# Patient Record
Sex: FEMALE | Race: WHITE | NOT HISPANIC OR LATINO | URBAN - METROPOLITAN AREA
[De-identification: names, ages, dates, MRNs, and addresses within clinical notes are randomized per-mention and may not be internally consistent; named-entity substitution may affect disease eponyms.]

---

## 2017-02-11 ENCOUNTER — EMERGENCY (EMERGENCY)
Facility: HOSPITAL | Age: 32
LOS: 1 days | Discharge: HOME | End: 2017-02-11
Attending: EMERGENCY MEDICINE | Admitting: EMERGENCY MEDICINE
Payer: COMMERCIAL

## 2017-02-11 VITALS
OXYGEN SATURATION: 98 % | RESPIRATION RATE: 18 BRPM | HEART RATE: 77 BPM | SYSTOLIC BLOOD PRESSURE: 114 MMHG | DIASTOLIC BLOOD PRESSURE: 73 MMHG | TEMPERATURE: 98 F

## 2017-02-11 DIAGNOSIS — N60.09 SOLITARY CYST OF UNSPECIFIED BREAST: Chronic | ICD-10-CM

## 2017-02-11 DIAGNOSIS — H92.01 OTALGIA, RIGHT EAR: ICD-10-CM

## 2017-02-11 PROCEDURE — 99283 EMERGENCY DEPT VISIT LOW MDM: CPT | Mod: 25

## 2017-02-11 PROCEDURE — 99283 EMERGENCY DEPT VISIT LOW MDM: CPT

## 2017-02-11 NOTE — ED PROVIDER NOTE - OBJECTIVE STATEMENT
32 yo female with no pMH, PSH, NKDA not on any meds presents with right ear pain, atraumatic, no hearing loss and feeling dizzy everytime she bends over. No n/v/visual changes, focal sensory deficits or gait imbalance. No f/s/c/nasal drainage.She has been having nasal congestion and would "pop her ears". On PE: AVSS, no mastoid ttp, op normal, no pain with pulling of pinna, no otitis externa, + evidence of a perforated TM with no purulence.  No otitis media.

## 2017-02-11 NOTE — ED PROVIDER NOTE - PLAN OF CARE
1) Do note get water in ear  2) Avoid flying  3) Do not put things in ear  4) Please use ofloxacin ear drops 5 drops in right ear every 12 hours for 5 days  5) Please follow with an ENT and your PMD

## 2017-02-11 NOTE — ED PROVIDER NOTE - CARE PLAN
Principal Discharge DX:	Perforated ear drum, right  Instructions for follow-up, activity and diet:	1) Do note get water in ear  2) Avoid flying  3) Do not put things in ear  4) Please use ofloxacin ear drops 5 drops in right ear every 12 hours for 5 days  5) Please follow with an ENT and your PMD

## 2017-02-11 NOTE — ED ADULT NURSE NOTE - OBJECTIVE STATEMENT
Presents with rt ear pain. Denies injury. Pain increases with bending forward. No fever/chills. No gait abnormalities. Denies numbness/tingling/cp/sob. Pt A&Ox3. FARRELL. Ambulates. Respirations even/unlabored. Abd soft. No n/v/d. Skin WDI. Exam by MD reveals perforated tympanic.

## 2018-01-22 ENCOUNTER — TRANSCRIPTION ENCOUNTER (OUTPATIENT)
Age: 33
End: 2018-01-22

## 2018-01-28 ENCOUNTER — TRANSCRIPTION ENCOUNTER (OUTPATIENT)
Age: 33
End: 2018-01-28

## 2019-02-02 ENCOUNTER — TRANSCRIPTION ENCOUNTER (OUTPATIENT)
Age: 34
End: 2019-02-02

## 2019-02-03 ENCOUNTER — INPATIENT (INPATIENT)
Facility: HOSPITAL | Age: 34
LOS: 2 days | Discharge: HOME | End: 2019-02-06
Attending: OBSTETRICS & GYNECOLOGY | Admitting: OBSTETRICS & GYNECOLOGY
Payer: COMMERCIAL

## 2019-02-03 VITALS
OXYGEN SATURATION: 100 % | TEMPERATURE: 97 F | SYSTOLIC BLOOD PRESSURE: 120 MMHG | DIASTOLIC BLOOD PRESSURE: 66 MMHG | HEART RATE: 86 BPM

## 2019-02-03 DIAGNOSIS — N60.09 SOLITARY CYST OF UNSPECIFIED BREAST: Chronic | ICD-10-CM

## 2019-02-03 DIAGNOSIS — O48.0 POST-TERM PREGNANCY: ICD-10-CM

## 2019-02-03 LAB
BASOPHILS # BLD AUTO: 0.1 K/UL — SIGNIFICANT CHANGE UP (ref 0–0.2)
BASOPHILS NFR BLD AUTO: 0.6 % — SIGNIFICANT CHANGE UP (ref 0–2)
BLD GP AB SCN SERPL QL: NEGATIVE — SIGNIFICANT CHANGE UP
EOSINOPHIL # BLD AUTO: 0.1 K/UL — SIGNIFICANT CHANGE UP (ref 0–0.5)
EOSINOPHIL NFR BLD AUTO: 0.9 % — SIGNIFICANT CHANGE UP (ref 0–6)
HCT VFR BLD CALC: 26.9 % — LOW (ref 34.5–45)
HGB BLD-MCNC: 9.5 G/DL — LOW (ref 11.5–15.5)
LYMPHOCYTES # BLD AUTO: 2.3 K/UL — SIGNIFICANT CHANGE UP (ref 1–3.3)
LYMPHOCYTES # BLD AUTO: 22.4 % — SIGNIFICANT CHANGE UP (ref 13–44)
MCHC RBC-ENTMCNC: 21.9 PG — LOW (ref 27–34)
MCHC RBC-ENTMCNC: 35.1 GM/DL — SIGNIFICANT CHANGE UP (ref 32–36)
MCV RBC AUTO: 62.4 FL — LOW (ref 80–100)
MONOCYTES # BLD AUTO: 0.6 K/UL — SIGNIFICANT CHANGE UP (ref 0–0.9)
MONOCYTES NFR BLD AUTO: 5.5 % — SIGNIFICANT CHANGE UP (ref 2–14)
NEUTROPHILS # BLD AUTO: 7.4 K/UL — SIGNIFICANT CHANGE UP (ref 1.8–7.4)
NEUTROPHILS NFR BLD AUTO: 70.6 % — SIGNIFICANT CHANGE UP (ref 43–77)
PLATELET # BLD AUTO: 257 K/UL — SIGNIFICANT CHANGE UP (ref 150–400)
RBC # BLD: 4.31 M/UL — SIGNIFICANT CHANGE UP (ref 3.8–5.2)
RBC # FLD: 15.8 % — HIGH (ref 10.3–14.5)
RH IG SCN BLD-IMP: POSITIVE — SIGNIFICANT CHANGE UP
RH IG SCN BLD-IMP: POSITIVE — SIGNIFICANT CHANGE UP
T PALLIDUM AB TITR SER: NEGATIVE — SIGNIFICANT CHANGE UP
WBC # BLD: 10.5 K/UL — SIGNIFICANT CHANGE UP (ref 3.8–10.5)
WBC # FLD AUTO: 10.5 K/UL — SIGNIFICANT CHANGE UP (ref 3.8–10.5)

## 2019-02-03 RX ORDER — SODIUM CHLORIDE 9 MG/ML
500 INJECTION, SOLUTION INTRAVENOUS
Qty: 0 | Refills: 0 | Status: DISCONTINUED | OUTPATIENT
Start: 2019-02-03 | End: 2019-02-06

## 2019-02-03 RX ORDER — DIPHENHYDRAMINE HCL 50 MG
25 CAPSULE ORAL EVERY 6 HOURS
Qty: 0 | Refills: 0 | Status: DISCONTINUED | OUTPATIENT
Start: 2019-02-03 | End: 2019-02-06

## 2019-02-03 RX ORDER — TETANUS TOXOID, REDUCED DIPHTHERIA TOXOID AND ACELLULAR PERTUSSIS VACCINE, ADSORBED 5; 2.5; 8; 8; 2.5 [IU]/.5ML; [IU]/.5ML; UG/.5ML; UG/.5ML; UG/.5ML
0.5 SUSPENSION INTRAMUSCULAR ONCE
Qty: 0 | Refills: 0 | Status: DISCONTINUED | OUTPATIENT
Start: 2019-02-03 | End: 2019-02-06

## 2019-02-03 RX ORDER — DOCUSATE SODIUM 100 MG
100 CAPSULE ORAL
Qty: 0 | Refills: 0 | Status: DISCONTINUED | OUTPATIENT
Start: 2019-02-03 | End: 2019-02-06

## 2019-02-03 RX ORDER — OXYCODONE HYDROCHLORIDE 5 MG/1
5 TABLET ORAL EVERY 4 HOURS
Qty: 0 | Refills: 0 | Status: COMPLETED | OUTPATIENT
Start: 2019-02-05 | End: 2019-02-12

## 2019-02-03 RX ORDER — SODIUM CHLORIDE 9 MG/ML
1000 INJECTION, SOLUTION INTRAVENOUS ONCE
Qty: 0 | Refills: 0 | Status: COMPLETED | OUTPATIENT
Start: 2019-02-03 | End: 2019-02-03

## 2019-02-03 RX ORDER — DEXAMETHASONE 0.5 MG/5ML
4 ELIXIR ORAL EVERY 6 HOURS
Qty: 0 | Refills: 0 | Status: DISCONTINUED | OUTPATIENT
Start: 2019-02-03 | End: 2019-02-05

## 2019-02-03 RX ORDER — IBUPROFEN 200 MG
600 TABLET ORAL EVERY 6 HOURS
Qty: 0 | Refills: 0 | Status: COMPLETED | OUTPATIENT
Start: 2019-02-05 | End: 2020-01-04

## 2019-02-03 RX ORDER — NALOXONE HYDROCHLORIDE 4 MG/.1ML
0.1 SPRAY NASAL
Qty: 0 | Refills: 0 | Status: DISCONTINUED | OUTPATIENT
Start: 2019-02-03 | End: 2019-02-05

## 2019-02-03 RX ORDER — ONDANSETRON 8 MG/1
4 TABLET, FILM COATED ORAL EVERY 6 HOURS
Qty: 0 | Refills: 0 | Status: DISCONTINUED | OUTPATIENT
Start: 2019-02-03 | End: 2019-02-05

## 2019-02-03 RX ORDER — OXYTOCIN 10 UNIT/ML
41.67 VIAL (ML) INJECTION
Qty: 20 | Refills: 0 | Status: DISCONTINUED | OUTPATIENT
Start: 2019-02-03 | End: 2019-02-06

## 2019-02-03 RX ORDER — OXYTOCIN 10 UNIT/ML
2 VIAL (ML) INJECTION
Qty: 30 | Refills: 0 | Status: DISCONTINUED | OUTPATIENT
Start: 2019-02-03 | End: 2019-02-03

## 2019-02-03 RX ORDER — SODIUM CHLORIDE 9 MG/ML
1000 INJECTION, SOLUTION INTRAVENOUS
Qty: 0 | Refills: 0 | Status: DISCONTINUED | OUTPATIENT
Start: 2019-02-03 | End: 2019-02-03

## 2019-02-03 RX ORDER — LANOLIN
1 OINTMENT (GRAM) TOPICAL
Qty: 0 | Refills: 0 | Status: DISCONTINUED | OUTPATIENT
Start: 2019-02-03 | End: 2019-02-06

## 2019-02-03 RX ORDER — OXYCODONE HYDROCHLORIDE 5 MG/1
5 TABLET ORAL
Qty: 0 | Refills: 0 | Status: COMPLETED | OUTPATIENT
Start: 2019-02-05 | End: 2019-02-12

## 2019-02-03 RX ORDER — OXYTOCIN 10 UNIT/ML
333.33 VIAL (ML) INJECTION
Qty: 20 | Refills: 0 | Status: DISCONTINUED | OUTPATIENT
Start: 2019-02-03 | End: 2019-02-06

## 2019-02-03 RX ORDER — ACETAMINOPHEN 500 MG
975 TABLET ORAL EVERY 6 HOURS
Qty: 0 | Refills: 0 | Status: DISCONTINUED | OUTPATIENT
Start: 2019-02-03 | End: 2019-02-06

## 2019-02-03 RX ORDER — OXYTOCIN 10 UNIT/ML
333.33 VIAL (ML) INJECTION
Qty: 20 | Refills: 0 | Status: DISCONTINUED | OUTPATIENT
Start: 2019-02-03 | End: 2019-02-03

## 2019-02-03 RX ORDER — SODIUM CHLORIDE 9 MG/ML
1000 INJECTION, SOLUTION INTRAVENOUS
Qty: 0 | Refills: 0 | Status: DISCONTINUED | OUTPATIENT
Start: 2019-02-03 | End: 2019-02-06

## 2019-02-03 RX ORDER — OXYTOCIN 10 UNIT/ML
41.67 VIAL (ML) INJECTION
Qty: 20 | Refills: 0 | Status: DISCONTINUED | OUTPATIENT
Start: 2019-02-03 | End: 2019-02-03

## 2019-02-03 RX ORDER — KETOROLAC TROMETHAMINE 30 MG/ML
30 SYRINGE (ML) INJECTION EVERY 6 HOURS
Qty: 0 | Refills: 0 | Status: DISCONTINUED | OUTPATIENT
Start: 2019-02-03 | End: 2019-02-05

## 2019-02-03 RX ORDER — CITRIC ACID/SODIUM CITRATE 300-500 MG
15 SOLUTION, ORAL ORAL EVERY 4 HOURS
Qty: 0 | Refills: 0 | Status: DISCONTINUED | OUTPATIENT
Start: 2019-02-03 | End: 2019-02-03

## 2019-02-03 RX ORDER — HEPARIN SODIUM 5000 [USP'U]/ML
5000 INJECTION INTRAVENOUS; SUBCUTANEOUS EVERY 12 HOURS
Qty: 0 | Refills: 0 | Status: DISCONTINUED | OUTPATIENT
Start: 2019-02-03 | End: 2019-02-06

## 2019-02-03 RX ORDER — GLYCERIN ADULT
1 SUPPOSITORY, RECTAL RECTAL AT BEDTIME
Qty: 0 | Refills: 0 | Status: DISCONTINUED | OUTPATIENT
Start: 2019-02-03 | End: 2019-02-06

## 2019-02-03 RX ORDER — FERROUS SULFATE 325(65) MG
325 TABLET ORAL DAILY
Qty: 0 | Refills: 0 | Status: DISCONTINUED | OUTPATIENT
Start: 2019-02-03 | End: 2019-02-04

## 2019-02-03 RX ORDER — SIMETHICONE 80 MG/1
80 TABLET, CHEWABLE ORAL EVERY 4 HOURS
Qty: 0 | Refills: 0 | Status: DISCONTINUED | OUTPATIENT
Start: 2019-02-03 | End: 2019-02-06

## 2019-02-03 RX ADMIN — Medication 30 MILLIGRAM(S): at 18:22

## 2019-02-03 RX ADMIN — Medication 15 MILLILITER(S): at 08:07

## 2019-02-03 RX ADMIN — SODIUM CHLORIDE 2000 MILLILITER(S): 9 INJECTION, SOLUTION INTRAVENOUS at 06:40

## 2019-02-03 RX ADMIN — SIMETHICONE 80 MILLIGRAM(S): 80 TABLET, CHEWABLE ORAL at 18:28

## 2019-02-03 RX ADMIN — Medication 30 MILLIGRAM(S): at 19:00

## 2019-02-03 RX ADMIN — Medication 125 MILLIUNIT(S)/MIN: at 11:17

## 2019-02-03 RX ADMIN — Medication 30 MILLIGRAM(S): at 11:20

## 2019-02-03 RX ADMIN — Medication 975 MILLIGRAM(S): at 16:00

## 2019-02-03 RX ADMIN — Medication 2 MILLIUNIT(S)/MIN: at 06:44

## 2019-02-03 RX ADMIN — HEPARIN SODIUM 5000 UNIT(S): 5000 INJECTION INTRAVENOUS; SUBCUTANEOUS at 18:22

## 2019-02-03 RX ADMIN — Medication 25 MILLIGRAM(S): at 19:22

## 2019-02-04 ENCOUNTER — TRANSCRIPTION ENCOUNTER (OUTPATIENT)
Age: 34
End: 2019-02-04

## 2019-02-04 LAB
BASOPHILS # BLD AUTO: 0.1 K/UL — SIGNIFICANT CHANGE UP (ref 0–0.2)
BASOPHILS NFR BLD AUTO: 0.7 % — SIGNIFICANT CHANGE UP (ref 0–2)
EOSINOPHIL # BLD AUTO: 0.1 K/UL — SIGNIFICANT CHANGE UP (ref 0–0.5)
EOSINOPHIL NFR BLD AUTO: 1.1 % — SIGNIFICANT CHANGE UP (ref 0–6)
HCT VFR BLD CALC: 21.7 % — LOW (ref 34.5–45)
HCT VFR BLD CALC: 25.4 % — LOW (ref 34.5–45)
HGB BLD-MCNC: 7.3 G/DL — LOW (ref 11.5–15.5)
HGB BLD-MCNC: 8.4 G/DL — LOW (ref 11.5–15.5)
LYMPHOCYTES # BLD AUTO: 2.1 K/UL — SIGNIFICANT CHANGE UP (ref 1–3.3)
LYMPHOCYTES # BLD AUTO: 26 % — SIGNIFICANT CHANGE UP (ref 13–44)
MCHC RBC-ENTMCNC: 21 PG — LOW (ref 27–34)
MCHC RBC-ENTMCNC: 21.4 PG — LOW (ref 27–34)
MCHC RBC-ENTMCNC: 33.1 GM/DL — SIGNIFICANT CHANGE UP (ref 32–36)
MCHC RBC-ENTMCNC: 33.7 GM/DL — SIGNIFICANT CHANGE UP (ref 32–36)
MCV RBC AUTO: 63.3 FL — LOW (ref 80–100)
MCV RBC AUTO: 63.5 FL — LOW (ref 80–100)
MONOCYTES # BLD AUTO: 0.4 K/UL — SIGNIFICANT CHANGE UP (ref 0–0.9)
MONOCYTES NFR BLD AUTO: 4.8 % — SIGNIFICANT CHANGE UP (ref 2–14)
NEUTROPHILS # BLD AUTO: 5.6 K/UL — SIGNIFICANT CHANGE UP (ref 1.8–7.4)
NEUTROPHILS NFR BLD AUTO: 67.4 % — SIGNIFICANT CHANGE UP (ref 43–77)
PLATELET # BLD AUTO: 190 K/UL — SIGNIFICANT CHANGE UP (ref 150–400)
PLATELET # BLD AUTO: 221 K/UL — SIGNIFICANT CHANGE UP (ref 150–400)
RBC # BLD: 3.42 M/UL — LOW (ref 3.8–5.2)
RBC # BLD: 4 M/UL — SIGNIFICANT CHANGE UP (ref 3.8–5.2)
RBC # FLD: 15.5 % — HIGH (ref 10.3–14.5)
RBC # FLD: 16 % — HIGH (ref 10.3–14.5)
WBC # BLD: 8.2 K/UL — SIGNIFICANT CHANGE UP (ref 3.8–10.5)
WBC # BLD: 9.6 K/UL — SIGNIFICANT CHANGE UP (ref 3.8–10.5)
WBC # FLD AUTO: 8.2 K/UL — SIGNIFICANT CHANGE UP (ref 3.8–10.5)
WBC # FLD AUTO: 9.6 K/UL — SIGNIFICANT CHANGE UP (ref 3.8–10.5)

## 2019-02-04 RX ORDER — ACETAMINOPHEN 500 MG
3 TABLET ORAL
Qty: 0 | Refills: 0 | DISCHARGE
Start: 2019-02-04

## 2019-02-04 RX ORDER — ASCORBIC ACID 60 MG
500 TABLET,CHEWABLE ORAL
Qty: 0 | Refills: 0 | Status: DISCONTINUED | OUTPATIENT
Start: 2019-02-04 | End: 2019-02-06

## 2019-02-04 RX ORDER — ASCORBIC ACID 60 MG
1 TABLET,CHEWABLE ORAL
Qty: 0 | Refills: 0 | DISCHARGE
Start: 2019-02-04

## 2019-02-04 RX ORDER — FERROUS SULFATE 325(65) MG
325 TABLET ORAL
Qty: 0 | Refills: 0 | Status: DISCONTINUED | OUTPATIENT
Start: 2019-02-04 | End: 2019-02-06

## 2019-02-04 RX ADMIN — Medication 975 MILLIGRAM(S): at 20:12

## 2019-02-04 RX ADMIN — Medication 30 MILLIGRAM(S): at 06:35

## 2019-02-04 RX ADMIN — Medication 30 MILLIGRAM(S): at 22:00

## 2019-02-04 RX ADMIN — Medication 30 MILLIGRAM(S): at 01:04

## 2019-02-04 RX ADMIN — HEPARIN SODIUM 5000 UNIT(S): 5000 INJECTION INTRAVENOUS; SUBCUTANEOUS at 17:55

## 2019-02-04 RX ADMIN — Medication 30 MILLIGRAM(S): at 22:16

## 2019-02-04 RX ADMIN — HEPARIN SODIUM 5000 UNIT(S): 5000 INJECTION INTRAVENOUS; SUBCUTANEOUS at 06:09

## 2019-02-04 RX ADMIN — Medication 30 MILLIGRAM(S): at 16:37

## 2019-02-04 RX ADMIN — Medication 975 MILLIGRAM(S): at 20:40

## 2019-02-04 RX ADMIN — Medication 100 MILLIGRAM(S): at 17:57

## 2019-02-04 RX ADMIN — Medication 975 MILLIGRAM(S): at 16:30

## 2019-02-04 RX ADMIN — Medication 325 MILLIGRAM(S): at 17:55

## 2019-02-04 RX ADMIN — SIMETHICONE 80 MILLIGRAM(S): 80 TABLET, CHEWABLE ORAL at 17:57

## 2019-02-04 RX ADMIN — Medication 30 MILLIGRAM(S): at 17:10

## 2019-02-04 RX ADMIN — Medication 30 MILLIGRAM(S): at 01:32

## 2019-02-04 RX ADMIN — Medication 30 MILLIGRAM(S): at 06:09

## 2019-02-04 RX ADMIN — Medication 975 MILLIGRAM(S): at 14:01

## 2019-02-04 RX ADMIN — Medication 500 MILLIGRAM(S): at 17:55

## 2019-02-04 NOTE — CHART NOTE - NSCHARTNOTEFT_GEN_A_CORE
R1 OBGYN EVENT NOTE    Patient seen and evaluated at bedside for anemia. Patient with low H/H on admission, dropped appropriately. Positive orthostatics postpartum. Patient denies lightheadedness, dizziness, chest pain, shortness of breath. Is ambulating, tolerating PO. Bleeding light.     O:   T(C): 36.7 (02-04-19 @ 17:06), Max: 36.7 (02-04-19 @ 17:06)  HR: 78 (02-04-19 @ 17:06) (78 - 78)  BP: 127/85 (02-04-19 @ 17:06) (127/85 - 127/85)  RR: 18 (02-04-19 @ 17:06) (18 - 18)  SpO2: 99% (02-04-19 @ 17:06) (99% - 99%)  Wt(kg): --  I&O's Summary    03 Feb 2019 07:01  -  04 Feb 2019 07:00  --------------------------------------------------------  IN: 3150 mL / OUT: 3050 mL / NET: 100 mL        Gen: Resting comfortably in bed, NAD  CV: RRR  Ext: SCD's in place and functional, no edema    acetaminophen   Tablet .. 975 milliGRAM(s) Oral every 6 hours  ascorbic acid 500 milliGRAM(s) Oral two times a day  dexamethasone  Injectable 4 milliGRAM(s) IV Push every 6 hours PRN  dextrose 5% + lactated ringers. 500 milliLiter(s) IV Continuous <Continuous>  diphenhydrAMINE 25 milliGRAM(s) Oral every 6 hours PRN  diphtheria/tetanus/pertussis (acellular) Vaccine (ADAcel) 0.5 milliLiter(s) IntraMuscular once  docusate sodium 100 milliGRAM(s) Oral two times a day PRN  fentaNYL (3 MICROgram(s)/mL) + BUpivacaine 0.01% in 0.9% Sodium Chloride PCEA 250 milliLiter(s) Epidural PCA Continuous  fentaNYL (3 MICROgram(s)/mL) + BUpivacaine 0.01% in 0.9% Sodium Chloride PCEA Rescue Clinician Bolus 5 milliLiter(s) Epidural every 15 minutes PRN  ferrous    sulfate 325 milliGRAM(s) Oral two times a day  glycerin Suppository - Adult 1 Suppository(s) Rectal at bedtime PRN  heparin  Injectable 5000 Unit(s) SubCutaneous every 12 hours  ketorolac   Injectable 30 milliGRAM(s) IV Push every 6 hours  lactated ringers. 1000 milliLiter(s) IV Continuous <Continuous>  lanolin Ointment 1 Application(s) Topical every 3 hours PRN  naloxone Injectable 0.1 milliGRAM(s) IV Push every 3 minutes PRN  ondansetron Injectable 4 milliGRAM(s) IV Push every 6 hours PRN  oxytocin Infusion 333.333 milliUNIT(s)/Min IV Continuous <Continuous>  oxytocin Infusion 41.667 milliUNIT(s)/Min IV Continuous <Continuous>  prenatal multivitamin 1 Tablet(s) Oral daily  simethicone 80 milliGRAM(s) Chew every 4 hours PRN            8.4  9.6 >< 221     02-04-19 @ 14:33        25.4          7.3  8.2 >< 190     02-04-19 @ 06:06        21.7          A/P: 33y POD#1 s/p LTCS with anemia, repeat H/H stable. H/H drop appropriate for EBL. Patient is hemodynamically stable and clinically well.  -continue to monitor  -POD#3 CBC    d/w Dr. Christopher Hernandez MD PGY1  Pager #[phone]

## 2019-02-04 NOTE — PROGRESS NOTE ADULT - SUBJECTIVE AND OBJECTIVE BOX
Postpartum Note-  Section POD#1    Allergies    No Known Allergies    Intolerances        Blood Type B Positive     RPR Negative    Rubella: Immune    S: Patient is a  33y P 1001   POD#1 S/P  primary C/Sec  Patient w/o complaints, pain is controlled.  Pt is OOB, tolerating PO, passing flatus. Lochia WNL. Voiding without difficulty    Feeding: Breast    O:  Vital Signs Last 24 Hrs  T(C): 36.7 (2019 05:00), Max: 36.9 (2019 14:40)  T(F): 98.1 (2019 05:00), Max: 98.5 (2019 01:00)  HR: 99 (2019 05:00) (66 - 99)  BP: 114/75 (2019 05:00) (112/61 - 143/62)  BP(mean): 95 (2019 12:35) (80 - 95)  RR: 18 (2019 05:00) (16 - 22)  SpO2: 97% (2019 05:00) (97% - 100%)  I&O's Summary    2019 07:01  -  2019 07:00  --------------------------------------------------------  IN: 3150 mL / OUT: 3050 mL / NET: 100 mL        Gen: NAD  Abdomen: +BS, Soft, nontender, non-distended, fundus firm.  Incision: Clean, dry, and intact.  Negative erythema/edema/ecchymosis   Sub Q/steri  Lochia WNL  Ext: SCDs in place.  Negative Homans B/L    LABS:                          7.3    8.2   )-----------( 190      ( 2019 06:06 )             21.7

## 2019-02-04 NOTE — DISCHARGE NOTE OB - PLAN OF CARE
safe delivery of baby and post partum care repeat c/s done, has low iron anemia need to be treated with oral iron

## 2019-02-04 NOTE — PROGRESS NOTE ADULT - SUBJECTIVE AND OBJECTIVE BOX
Patient seen and examined at bedside, no acute overnight events. No acute complaints, pain well controlled. Patient is ambulating and tolerating regular diet. Has not yet passed flatus. Pt is breast  feeding her baby.  not using her epidural anesthesia.  she is not symptomatic from low h&h and walking and moving around  is by her.    Vital Signs Last 24 Hours  T(C): 36.7 (02-04-19 @ 09:17), Max: 36.9 (02-03-19 @ 14:40)  HR: 88 (02-04-19 @ 09:17) (66 - 99)  BP: 121/84 (02-04-19 @ 09:17) (112/61 - 123/78)  RR: 17 (02-04-19 @ 09:17) (17 - 22)  SpO2: 98% (02-04-19 @ 09:17) (97% - 100%)    I&O's Summary    03 Feb 2019 07:01  -  04 Feb 2019 07:00  --------------------------------------------------------  IN: 3150 mL / OUT: 3050 mL / NET: 100 mL        Physical Exam:  General: NAD  Abdomen: Soft, non-tender, non-distended, fundus firm  Incision: Pfannenstiel incision CDI, subcuticular suture closure  Pelvic: Lochia wnl    Labs:    Blood Type: B Positive  Antibody Screen: --  RPR: Negative               7.3    8.2   )-----------( 190      ( 02-04 @ 06:06 )             21.7                9.5    10.5  )-----------( 257      ( 02-03 @ 06:53 )             26.9         MEDICATIONS  (STANDING):  acetaminophen   Tablet .. 975 milliGRAM(s) Oral every 6 hours  ascorbic acid 500 milliGRAM(s) Oral two times a day  dextrose 5% + lactated ringers. 500 milliLiter(s) (125 mL/Hr) IV Continuous <Continuous>  diphtheria/tetanus/pertussis (acellular) Vaccine (ADAcel) 0.5 milliLiter(s) IntraMuscular once  fentaNYL (3 MICROgram(s)/mL) + BUpivacaine 0.01% in 0.9% Sodium Chloride PCEA 250 milliLiter(s) Epidural PCA Continuous  ferrous    sulfate 325 milliGRAM(s) Oral two times a day  heparin  Injectable 5000 Unit(s) SubCutaneous every 12 hours  ketorolac   Injectable 30 milliGRAM(s) IV Push every 6 hours  lactated ringers. 1000 milliLiter(s) (125 mL/Hr) IV Continuous <Continuous>  oxytocin Infusion 333.333 milliUNIT(s)/Min (1000 mL/Hr) IV Continuous <Continuous>  oxytocin Infusion 41.667 milliUNIT(s)/Min (125 mL/Hr) IV Continuous <Continuous>  prenatal multivitamin 1 Tablet(s) Oral daily    MEDICATIONS  (PRN):  dexamethasone  Injectable 4 milliGRAM(s) IV Push every 6 hours PRN Nausea, IF ondansetron is ineffective after 30 - 60 minutes  diphenhydrAMINE 25 milliGRAM(s) Oral every 6 hours PRN Itching  docusate sodium 100 milliGRAM(s) Oral two times a day PRN Stool Softening  fentaNYL (3 MICROgram(s)/mL) + BUpivacaine 0.01% in 0.9% Sodium Chloride PCEA Rescue Clinician Bolus 5 milliLiter(s) Epidural every 15 minutes PRN Moderate Pain (4 - 6)  glycerin Suppository - Adult 1 Suppository(s) Rectal at bedtime PRN Constipation  lanolin Ointment 1 Application(s) Topical every 3 hours PRN Sore Nipples  naloxone Injectable 0.1 milliGRAM(s) IV Push every 3 minutes PRN For ANY of the following changes in patient status:  A. RR LESS THAN 10 breaths per minute, B. Oxygen saturation LESS THAN 90%, C. Sedation score of 6  ondansetron Injectable 4 milliGRAM(s) IV Push every 6 hours PRN Nausea  simethicone 80 milliGRAM(s) Chew every 4 hours PRN Gas

## 2019-02-04 NOTE — DISCHARGE NOTE OB - HOSPITAL COURSE
induction was started but stopped due to variable deceleration, repeat lscs was done cord around body was noticed,  post op, it was diagnosed she is low h&h , but she came low to hospital and she was asymptomatic post op. treated with oral iron

## 2019-02-04 NOTE — DISCHARGE NOTE OB - CARE PLAN
Principal Discharge DX:	 delivery delivered  Goal:	safe delivery of baby and post partum care  Assessment and plan of treatment:	repeat c/s done, has low iron anemia need to be treated with oral iron

## 2019-02-04 NOTE — DISCHARGE NOTE OB - PATIENT PORTAL LINK FT
You can access the [facility] Patient Portal, offered by [facility], by registering with the following website: [email or web address]

## 2019-02-04 NOTE — PROGRESS NOTE ADULT - SUBJECTIVE AND OBJECTIVE BOX
Day 1 of Anesthesia Pain Management Service    SUBJECTIVE: I'm okay  Pain Scale Score:    [X] Refer to charted pain scores    THERAPY: Epidural Bupivacaine 0.01 % and Fentanyl 3 micrograms/mL     Demand Dose: 3 mL  Lockout: 15 minutes   Continuous Rate:  10 mL    MEDICATIONS  (STANDING):  acetaminophen   Tablet .. 975 milliGRAM(s) Oral every 6 hours  ascorbic acid 500 milliGRAM(s) Oral two times a day  dextrose 5% + lactated ringers. 500 milliLiter(s) (125 mL/Hr) IV Continuous <Continuous>  diphtheria/tetanus/pertussis (acellular) Vaccine (ADAcel) 0.5 milliLiter(s) IntraMuscular once  fentaNYL (3 MICROgram(s)/mL) + BUpivacaine 0.01% in 0.9% Sodium Chloride PCEA 250 milliLiter(s) Epidural PCA Continuous  ferrous    sulfate 325 milliGRAM(s) Oral two times a day  heparin  Injectable 5000 Unit(s) SubCutaneous every 12 hours  ketorolac   Injectable 30 milliGRAM(s) IV Push every 6 hours  lactated ringers. 1000 milliLiter(s) (125 mL/Hr) IV Continuous <Continuous>  oxytocin Infusion 333.333 milliUNIT(s)/Min (1000 mL/Hr) IV Continuous <Continuous>  oxytocin Infusion 41.667 milliUNIT(s)/Min (125 mL/Hr) IV Continuous <Continuous>  prenatal multivitamin 1 Tablet(s) Oral daily    MEDICATIONS  (PRN):  dexamethasone  Injectable 4 milliGRAM(s) IV Push every 6 hours PRN Nausea, IF ondansetron is ineffective after 30 - 60 minutes  diphenhydrAMINE 25 milliGRAM(s) Oral every 6 hours PRN Itching  docusate sodium 100 milliGRAM(s) Oral two times a day PRN Stool Softening  fentaNYL (3 MICROgram(s)/mL) + BUpivacaine 0.01% in 0.9% Sodium Chloride PCEA Rescue Clinician Bolus 5 milliLiter(s) Epidural every 15 minutes PRN Moderate Pain (4 - 6)  glycerin Suppository - Adult 1 Suppository(s) Rectal at bedtime PRN Constipation  lanolin Ointment 1 Application(s) Topical every 3 hours PRN Sore Nipples  naloxone Injectable 0.1 milliGRAM(s) IV Push every 3 minutes PRN For ANY of the following changes in patient status:  A. RR LESS THAN 10 breaths per minute, B. Oxygen saturation LESS THAN 90%, C. Sedation score of 6  ondansetron Injectable 4 milliGRAM(s) IV Push every 6 hours PRN Nausea  simethicone 80 milliGRAM(s) Chew every 4 hours PRN Gas      OBJECTIVE:    Assessment of Epidural Catheter Site: 	    [X] Dressing intact	[X] Site non-tender	[X] Site without erythema, discharge, edema  [X] Epidural tubing and connection checked	[X] Gross neurological exam within normal limits  [ ] Catheter removed – tip intact		                          7.3    8.2   )-----------( 190      ( 04 Feb 2019 06:06 )             21.7     Vital Signs Last 24 Hrs  T(C): 36.7 (02-04-19 @ 09:17), Max: 36.9 (02-03-19 @ 14:40)  T(F): 98 (02-04-19 @ 09:17), Max: 98.5 (02-04-19 @ 01:00)  HR: 88 (02-04-19 @ 09:17) (66 - 99)  BP: 121/84 (02-04-19 @ 09:17) (112/61 - 143/62)  BP(mean): 95 (02-03-19 @ 12:35) (80 - 95)  RR: 17 (02-04-19 @ 09:17) (16 - 22)  SpO2: 98% (02-04-19 @ 09:17) (97% - 100%)      Sedation Score:	[X] Alert	[ ] Drowsy	[ ] Arousable  [ ] Asleep  [ ] Unresponsive    Side Effects:	[  ] None	[ ] Nausea	[ ] Vomiting  [X ] Pruritus  		[ ] Weakness  [ ] Numbness  [ ] Other:    ASSESSMENT/ PLAN:    Therapy:                         [X] Continue   [ ] Discontinue   [ ] Change to PRN Analgesics    Documentation and Verification of current medications:  [X] Done	[ ] Not done, not eligible, reason:    Comments:

## 2019-02-04 NOTE — DISCHARGE NOTE OB - MEDICATION SUMMARY - MEDICATIONS TO STOP TAKING
I will STOP taking the medications listed below when I get home from the hospital:    Zofran ODT 4 mg oral tablet, disintegrating  -- 1 tab(s) by mouth every 6 hours    acetaminophen-oxyCODONE 325 mg-5 mg oral tablet  -- 2 tab(s) by mouth every 6 hours, As needed, Severe Pain

## 2019-02-04 NOTE — DISCHARGE NOTE OB - BECAUSE OF A PHYSICAL, MENTAL OR EMOTIONAL CONDITION, DO YOU HAVE DIFFICULTY DOING  ERRANDS ALONE LIKE VISITING A DOCTOR'S OFFICE OR SHOPPING (15 YEARS AND OLDER)
Please call the patient regarding her abnormal result. Calcium levels are high, if not taking a calcium supplement, would recommend she have a lab PTH. Triglycerides are high. Recommend attention to diet quality, recheck 1 yr. No

## 2019-02-04 NOTE — DISCHARGE NOTE OB - CARE PROVIDER_API CALL
Nahum White)  Obstetrics and Gynecology  [address], [address]  [place] [address]  Phone: [phone]  Fax: [phone]

## 2019-02-04 NOTE — DISCHARGE NOTE OB - MEDICATION SUMMARY - MEDICATIONS TO TAKE
I will START or STAY ON the medications listed below when I get home from the hospital:    iron  -- 1 tab(s) by mouth once a day  -- Indication: For for anemia    ibuprofen 600 mg oral tablet  -- 1 tab(s) by mouth every 6 hours, As needed, Mild pain or headache  -- Indication: For for sever pain    Actamin 325 mg oral tablet  -- 3 tab(s) by mouth every 6 hours  -- Indication: For for moderate pain    Prenatal Multivitamins with Folic Acid 1 mg oral tablet  -- 1 tab(s) by mouth once a day  -- Indication: For for breast feeding    ascorbic acid 500 mg oral tablet  -- 1 tab(s) by mouth 2 times a day  -- Indication: For for anemia

## 2019-02-05 DIAGNOSIS — R51 HEADACHE: ICD-10-CM

## 2019-02-05 DIAGNOSIS — D50.0 IRON DEFICIENCY ANEMIA SECONDARY TO BLOOD LOSS (CHRONIC): ICD-10-CM

## 2019-02-05 RX ORDER — IBUPROFEN 200 MG
600 TABLET ORAL EVERY 6 HOURS
Qty: 0 | Refills: 0 | Status: DISCONTINUED | OUTPATIENT
Start: 2019-02-05 | End: 2019-02-06

## 2019-02-05 RX ORDER — PSEUDOEPHEDRINE HCL 30 MG
60 TABLET ORAL EVERY 6 HOURS
Qty: 0 | Refills: 0 | Status: DISCONTINUED | OUTPATIENT
Start: 2019-02-05 | End: 2019-02-05

## 2019-02-05 RX ORDER — OXYCODONE HYDROCHLORIDE 5 MG/1
5 TABLET ORAL
Qty: 0 | Refills: 0 | Status: DISCONTINUED | OUTPATIENT
Start: 2019-02-05 | End: 2019-02-06

## 2019-02-05 RX ORDER — OXYCODONE HYDROCHLORIDE 5 MG/1
5 TABLET ORAL EVERY 4 HOURS
Qty: 0 | Refills: 0 | Status: DISCONTINUED | OUTPATIENT
Start: 2019-02-05 | End: 2019-02-06

## 2019-02-05 RX ADMIN — OXYCODONE HYDROCHLORIDE 5 MILLIGRAM(S): 5 TABLET ORAL at 16:47

## 2019-02-05 RX ADMIN — Medication 600 MILLIGRAM(S): at 18:22

## 2019-02-05 RX ADMIN — Medication 975 MILLIGRAM(S): at 22:45

## 2019-02-05 RX ADMIN — Medication 600 MILLIGRAM(S): at 12:09

## 2019-02-05 RX ADMIN — Medication 325 MILLIGRAM(S): at 05:32

## 2019-02-05 RX ADMIN — SIMETHICONE 80 MILLIGRAM(S): 80 TABLET, CHEWABLE ORAL at 09:54

## 2019-02-05 RX ADMIN — Medication 975 MILLIGRAM(S): at 09:54

## 2019-02-05 RX ADMIN — Medication 975 MILLIGRAM(S): at 16:18

## 2019-02-05 RX ADMIN — OXYCODONE HYDROCHLORIDE 5 MILLIGRAM(S): 5 TABLET ORAL at 16:22

## 2019-02-05 RX ADMIN — HEPARIN SODIUM 5000 UNIT(S): 5000 INJECTION INTRAVENOUS; SUBCUTANEOUS at 05:32

## 2019-02-05 RX ADMIN — Medication 975 MILLIGRAM(S): at 10:24

## 2019-02-05 RX ADMIN — Medication 1 TABLET(S): at 12:09

## 2019-02-05 RX ADMIN — Medication 325 MILLIGRAM(S): at 18:24

## 2019-02-05 RX ADMIN — Medication 600 MILLIGRAM(S): at 12:39

## 2019-02-05 RX ADMIN — Medication 100 MILLIGRAM(S): at 18:21

## 2019-02-05 RX ADMIN — Medication 500 MILLIGRAM(S): at 18:23

## 2019-02-05 RX ADMIN — Medication 30 MILLIGRAM(S): at 06:00

## 2019-02-05 RX ADMIN — SIMETHICONE 80 MILLIGRAM(S): 80 TABLET, CHEWABLE ORAL at 18:21

## 2019-02-05 RX ADMIN — HEPARIN SODIUM 5000 UNIT(S): 5000 INJECTION INTRAVENOUS; SUBCUTANEOUS at 18:24

## 2019-02-05 RX ADMIN — Medication 100 MILLIGRAM(S): at 05:32

## 2019-02-05 RX ADMIN — Medication 975 MILLIGRAM(S): at 22:00

## 2019-02-05 RX ADMIN — Medication 30 MILLIGRAM(S): at 05:32

## 2019-02-05 RX ADMIN — Medication 975 MILLIGRAM(S): at 16:46

## 2019-02-05 NOTE — CHART NOTE - NSCHARTNOTEFT_GEN_A_CORE
R1 OBGYN EVENT NOTE    Patient seen and evaluated at bedside for severe HA, 10/10, gradual onset, b/l, fronto-temporal. Associated with photosensitivity, nasal congestion. Not relieved with motrin/tylenol/oxycodone. Not associated w blurry vision, chest pain, SOB. No palpitations, fevers/chills. No nausea/vomiting. Patient has not previously had a similar HA.    O:   T(C): 36.7 (02-05-19 @ 16:58), Max: 36.7 (02-05-19 @ 16:58)  HR: 98 (02-05-19 @ 16:58) (98 - 98)  BP: 134/86 (02-05-19 @ 16:58) (134/86 - 134/86)  RR: 16 (02-05-19 @ 16:58) (16 - 16)  SpO2: 96% (02-05-19 @ 16:58) (96% - 96%)  Wt(kg): --  I&O's Summary      Gen: Resting comfortably in bed, NAD  HEENT: no TTP over frontal or ethmoid sinuses  Neuro: CN 1-12 grossly intact    acetaminophen   Tablet .. 975 milliGRAM(s) Oral every 6 hours  ascorbic acid 500 milliGRAM(s) Oral two times a day  dextrose 5% + lactated ringers. 500 milliLiter(s) IV Continuous <Continuous>  diphenhydrAMINE 25 milliGRAM(s) Oral every 6 hours PRN  diphtheria/tetanus/pertussis (acellular) Vaccine (ADAcel) 0.5 milliLiter(s) IntraMuscular once  docusate sodium 100 milliGRAM(s) Oral two times a day PRN  ferrous    sulfate 325 milliGRAM(s) Oral two times a day  glycerin Suppository - Adult 1 Suppository(s) Rectal at bedtime PRN  heparin  Injectable 5000 Unit(s) SubCutaneous every 12 hours  ibuprofen  Tablet. 600 milliGRAM(s) Oral every 6 hours  lactated ringers. 1000 milliLiter(s) IV Continuous <Continuous>  lanolin Ointment 1 Application(s) Topical every 3 hours PRN  oxyCODONE    IR 5 milliGRAM(s) Oral every 3 hours  oxyCODONE    IR 5 milliGRAM(s) Oral every 4 hours PRN  oxytocin Infusion 333.333 milliUNIT(s)/Min IV Continuous <Continuous>  oxytocin Infusion 41.667 milliUNIT(s)/Min IV Continuous <Continuous>  prenatal multivitamin 1 Tablet(s) Oral daily  simethicone 80 milliGRAM(s) Chew every 4 hours PRN      A/P: 33y POD#2 s/p rLTCS (failed TOLAC) c/b anemia (appropriate for EBL), now with severe headache. Headache consistent in timecourse with spinal headache. Low suspicion for PEC given normotensive BPs and lack of other severe symptoms. Time course not consistent with intracranial pathology.   -Anesthesia consult  -caffeine  -nasal decongestant    d/w Dr. Christopher Hernandez MD PGY1  Pager #[phone]

## 2019-02-05 NOTE — PROGRESS NOTE ADULT - SUBJECTIVE AND OBJECTIVE BOX
Patient seen and examined at bedside, no acute overnight events. No acute complaints, pain well controlled. Patient is ambulating, voiding spontaneously, passing flatus, and tolerating regular diet. Pt is breast feeding her baby.  patient reports yesterday had headache improved after she took pain killer and was able to sleep with no problem, today the headache is back after 11 am. there is no visual disturbances, no vomiting, no nausea. she has not have coffee for few days.  Vital Signs Last 24 Hours  T(C): 36.7 (02-05-19 @ 16:58), Max: 36.9 (02-04-19 @ 21:06)  HR: 98 (02-05-19 @ 16:58) (93 - 100)  BP: 134/86 (02-05-19 @ 16:58) (123/80 - 138/88)  RR: 16 (02-05-19 @ 16:58) (16 - 18)  SpO2: 96% (02-05-19 @ 16:58) (96% - 100%)    Physical Exam:  General: NAD  Abdomen: Soft, non-tender, non-distended, fundus firm  Incision: Pfannenstiel incision CDI, subcuticular suture closure  Pelvic: Lochia wnl    Labs:    Blood Type: B Positive  Antibody Screen: --  RPR: Negative               8.4    9.6   )-----------( 221      ( 02-04 @ 14:33 )             25.4                7.3    8.2   )-----------( 190      ( 02-04 @ 06:06 )             21.7                9.5    10.5  )-----------( 257      ( 02-03 @ 06:53 )             26.9         MEDICATIONS  (STANDING):  acetaminophen   Tablet .. 975 milliGRAM(s) Oral every 6 hours  ascorbic acid 500 milliGRAM(s) Oral two times a day  dextrose 5% + lactated ringers. 500 milliLiter(s) (125 mL/Hr) IV Continuous <Continuous>  diphtheria/tetanus/pertussis (acellular) Vaccine (ADAcel) 0.5 milliLiter(s) IntraMuscular once  ferrous    sulfate 325 milliGRAM(s) Oral two times a day  heparin  Injectable 5000 Unit(s) SubCutaneous every 12 hours  ibuprofen  Tablet. 600 milliGRAM(s) Oral every 6 hours  lactated ringers. 1000 milliLiter(s) (125 mL/Hr) IV Continuous <Continuous>  oxyCODONE    IR 5 milliGRAM(s) Oral every 3 hours  oxytocin Infusion 333.333 milliUNIT(s)/Min (1000 mL/Hr) IV Continuous <Continuous>  oxytocin Infusion 41.667 milliUNIT(s)/Min (125 mL/Hr) IV Continuous <Continuous>  prenatal multivitamin 1 Tablet(s) Oral daily    MEDICATIONS  (PRN):  diphenhydrAMINE 25 milliGRAM(s) Oral every 6 hours PRN Itching  docusate sodium 100 milliGRAM(s) Oral two times a day PRN Stool Softening  glycerin Suppository - Adult 1 Suppository(s) Rectal at bedtime PRN Constipation  lanolin Ointment 1 Application(s) Topical every 3 hours PRN Sore Nipples  oxyCODONE    IR 5 milliGRAM(s) Oral every 4 hours PRN Severe Pain (7 - 10)  simethicone 80 milliGRAM(s) Chew every 4 hours PRN Gas

## 2019-02-05 NOTE — PROVIDER CONTACT NOTE (CHANGE IN STATUS NOTIFICATION) - ASSESSMENT
Pt with complaint of severe headache.  Unrelieved upon laying down.  bp of 134/86, hr 98, temp 36.7, rr 16, sp02 96%.

## 2019-02-05 NOTE — PROGRESS NOTE ADULT - SUBJECTIVE AND OBJECTIVE BOX
Postpartum Note-  Section POD#2    No Known Allergies  Blood Type B Positive   RPR Negative  Rubella: Immune    S: Patient is a  33y P 1001   POD#2 S/P  primary C/Sec  Patient w/o complaints, pain is controlled.  Pt is OOB, tolerating PO, passing flatus. Lochia WNL. Voiding without difficulty  (+) mild h/a relieved w/ Toradol. Denies SOB/CP, palpitations or dizziness.  Feeding: Breast    O:  ICU Vital Signs Last 24 Hrs  T(C): 36.7 (2019 05:00), Max: 36.9 (2019 13:06)  T(F): 98.1 (2019 05:00), Max: 98.4 (2019 13:06)  HR: 98 (2019 05:00) (78 - 98)  BP: 136/85 (2019 05:00) (121/84 - 136/85)  RR: 18 (2019 05:00) (17 - 18)  SpO2: 99% (2019 05:00) (98% - 100%)        Gen: NAD  Abdomen:, Soft, nontender, non-distended, fundus firm.  Incision: Clean, dry, and intact.  Negative erythema/edema/ecchymosis   Sub Q/steri  Lochia WNL  Ext: Soft/NT B/L,  Negative Homans B/L    LABS:                        8.4    9.6   )-----------( 221      ( 2019 14:33 )             25.4                           7.3    8.2   )-----------( 190      ( 2019 06:06 )             21.7

## 2019-02-05 NOTE — PROGRESS NOTE ADULT - PROBLEM SELECTOR PLAN 2
C/W iron, vit C, colace supplementation  CBC in AM POD#3, or earlier as needed  Asymptomatic, not requiring transfusion at this time

## 2019-02-05 NOTE — PROGRESS NOTE ADULT - SUBJECTIVE AND OBJECTIVE BOX
This patient is ~60 hrs post-partum after an urgent  for non-reassuring fetal heart tracing. She had been in labor prior to the  and received an uncomplicated combined spinal-epidural for the procedure. Yesterday evening she began having the headache and has worsened to 9/10 today. She has experienced headaches in the past and does not feel that this one is any different than she's ever had. The headache is non-focal and she has not had relief from NSAIDs. She does not describe any improvement or worsening related to her position and she can sit up and walk easily without any added discomfort. She describes no fevers, photophobia, paresthesias, sensory or motor deficits.     Mrs. Colón describes no symptoms consistent with a postdural puncture headache and despite having had a dural puncture for her CSE, this was done with a 27g needle, which is unlikely to cause any sizable CSF leak. A blood patch would not be warranted. Recommend PO hydration, caffeine, NSAIDs and further work-up by the primary team as they see fit.

## 2019-02-05 NOTE — PROGRESS NOTE ADULT - SUBJECTIVE AND OBJECTIVE BOX
Day 2 of Anesthesia Pain Management Service    SUBJECTIVE: I'm okay  Pain Scale Score:    [X] Refer to charted pain scores    THERAPY: Epidural Bupivacaine 0.01 % and Fentanyl 3 micrograms/mL     Demand Dose: 3 mL  Lockout: 15 minutes   Continuous Rate:  10 mL    MEDICATIONS  (STANDING):  acetaminophen   Tablet .. 975 milliGRAM(s) Oral every 6 hours  ascorbic acid 500 milliGRAM(s) Oral two times a day  dextrose 5% + lactated ringers. 500 milliLiter(s) (125 mL/Hr) IV Continuous <Continuous>  diphtheria/tetanus/pertussis (acellular) Vaccine (ADAcel) 0.5 milliLiter(s) IntraMuscular once  fentaNYL (3 MICROgram(s)/mL) + BUpivacaine 0.01% in 0.9% Sodium Chloride PCEA 250 milliLiter(s) Epidural PCA Continuous  ferrous    sulfate 325 milliGRAM(s) Oral two times a day  heparin  Injectable 5000 Unit(s) SubCutaneous every 12 hours  lactated ringers. 1000 milliLiter(s) (125 mL/Hr) IV Continuous <Continuous>  oxytocin Infusion 333.333 milliUNIT(s)/Min (1000 mL/Hr) IV Continuous <Continuous>  oxytocin Infusion 41.667 milliUNIT(s)/Min (125 mL/Hr) IV Continuous <Continuous>  prenatal multivitamin 1 Tablet(s) Oral daily    MEDICATIONS  (PRN):  dexamethasone  Injectable 4 milliGRAM(s) IV Push every 6 hours PRN Nausea, IF ondansetron is ineffective after 30 - 60 minutes  diphenhydrAMINE 25 milliGRAM(s) Oral every 6 hours PRN Itching  docusate sodium 100 milliGRAM(s) Oral two times a day PRN Stool Softening  fentaNYL (3 MICROgram(s)/mL) + BUpivacaine 0.01% in 0.9% Sodium Chloride PCEA Rescue Clinician Bolus 5 milliLiter(s) Epidural every 15 minutes PRN Moderate Pain (4 - 6)  glycerin Suppository - Adult 1 Suppository(s) Rectal at bedtime PRN Constipation  lanolin Ointment 1 Application(s) Topical every 3 hours PRN Sore Nipples  naloxone Injectable 0.1 milliGRAM(s) IV Push every 3 minutes PRN For ANY of the following changes in patient status:  A. RR LESS THAN 10 breaths per minute, B. Oxygen saturation LESS THAN 90%, C. Sedation score of 6  ondansetron Injectable 4 milliGRAM(s) IV Push every 6 hours PRN Nausea  simethicone 80 milliGRAM(s) Chew every 4 hours PRN Gas      OBJECTIVE:    Assessment of Epidural Catheter Site: 	    [ ] Dressing intact	[X] Site non-tender	[X] Site without erythema, discharge, edema  [ ] Epidural tubing and connection checked	[X] Gross neurological exam within normal limits  [X] Catheter removed                          8.4    9.6   )-----------( 221      ( 04 Feb 2019 14:33 )             25.4     Vital Signs Last 24 Hrs  T(C): 36.7 (02-05-19 @ 05:00), Max: 36.9 (02-04-19 @ 13:06)  T(F): 98.1 (02-05-19 @ 05:00), Max: 98.4 (02-04-19 @ 13:06)  HR: 98 (02-05-19 @ 05:00) (78 - 98)  BP: 136/85 (02-05-19 @ 05:00) (123/80 - 136/85)  BP(mean): --  RR: 18 (02-05-19 @ 05:00) (17 - 18)  SpO2: 99% (02-05-19 @ 05:00) (99% - 100%)      Sedation Score:	[X] Alert	[ ] Drowsy	[ ] Arousable  [ ] Asleep  [ ] Unresponsive    Side Effects:	[X] None	[ ] Nausea	[ ] Vomiting  [ ] Pruritus  		[ ] Weakness  [ ] Numbness  [ ] Other:    ASSESSMENT/ PLAN:    Therapy:                         [ ] Continue   [X] Discontinue   [X] Change to PRN Analgesics    Documentation and Verification of current medications:  [X] Done	[ ] Not done, not eligible, reason:    Comments: Plan to  D\C PCEA after 9:30

## 2019-02-06 VITALS
TEMPERATURE: 98 F | DIASTOLIC BLOOD PRESSURE: 84 MMHG | HEART RATE: 78 BPM | SYSTOLIC BLOOD PRESSURE: 133 MMHG | OXYGEN SATURATION: 100 % | RESPIRATION RATE: 18 BRPM

## 2019-02-06 LAB
BASOPHILS # BLD AUTO: 0 K/UL — SIGNIFICANT CHANGE UP (ref 0–0.2)
BASOPHILS NFR BLD AUTO: 0.3 % — SIGNIFICANT CHANGE UP (ref 0–2)
EOSINOPHIL # BLD AUTO: 0.1 K/UL — SIGNIFICANT CHANGE UP (ref 0–0.5)
EOSINOPHIL NFR BLD AUTO: 1.9 % — SIGNIFICANT CHANGE UP (ref 0–6)
HCT VFR BLD CALC: 21.7 % — LOW (ref 34.5–45)
HCT VFR BLD CALC: 23.5 % — LOW (ref 34.5–45)
HGB BLD-MCNC: 7.1 G/DL — LOW (ref 11.5–15.5)
HGB BLD-MCNC: 7.9 G/DL — LOW (ref 11.5–15.5)
LYMPHOCYTES # BLD AUTO: 2.2 K/UL — SIGNIFICANT CHANGE UP (ref 1–3.3)
LYMPHOCYTES # BLD AUTO: 30.2 % — SIGNIFICANT CHANGE UP (ref 13–44)
MCHC RBC-ENTMCNC: 20.7 PG — LOW (ref 27–34)
MCHC RBC-ENTMCNC: 21.4 PG — LOW (ref 27–34)
MCHC RBC-ENTMCNC: 32.6 GM/DL — SIGNIFICANT CHANGE UP (ref 32–36)
MCHC RBC-ENTMCNC: 33.8 GM/DL — SIGNIFICANT CHANGE UP (ref 32–36)
MCV RBC AUTO: 63.3 FL — LOW (ref 80–100)
MCV RBC AUTO: 63.4 FL — LOW (ref 80–100)
MONOCYTES # BLD AUTO: 0.4 K/UL — SIGNIFICANT CHANGE UP (ref 0–0.9)
MONOCYTES NFR BLD AUTO: 5.7 % — SIGNIFICANT CHANGE UP (ref 2–14)
NEUTROPHILS # BLD AUTO: 4.4 K/UL — SIGNIFICANT CHANGE UP (ref 1.8–7.4)
NEUTROPHILS NFR BLD AUTO: 62 % — SIGNIFICANT CHANGE UP (ref 43–77)
PLATELET # BLD AUTO: 194 K/UL — SIGNIFICANT CHANGE UP (ref 150–400)
PLATELET # BLD AUTO: 276 K/UL — SIGNIFICANT CHANGE UP (ref 150–400)
RBC # BLD: 3.42 M/UL — LOW (ref 3.8–5.2)
RBC # BLD: 3.71 M/UL — LOW (ref 3.8–5.2)
RBC # FLD: 16.2 % — HIGH (ref 10.3–14.5)
RBC # FLD: 16.4 % — HIGH (ref 10.3–14.5)
WBC # BLD: 7.2 K/UL — SIGNIFICANT CHANGE UP (ref 3.8–10.5)
WBC # BLD: 8.9 K/UL — SIGNIFICANT CHANGE UP (ref 3.8–10.5)
WBC # FLD AUTO: 7.2 K/UL — SIGNIFICANT CHANGE UP (ref 3.8–10.5)
WBC # FLD AUTO: 8.9 K/UL — SIGNIFICANT CHANGE UP (ref 3.8–10.5)

## 2019-02-06 PROCEDURE — 86901 BLOOD TYPING SEROLOGIC RH(D): CPT

## 2019-02-06 PROCEDURE — 59025 FETAL NON-STRESS TEST: CPT

## 2019-02-06 PROCEDURE — 59050 FETAL MONITOR W/REPORT: CPT

## 2019-02-06 PROCEDURE — 85027 COMPLETE CBC AUTOMATED: CPT

## 2019-02-06 PROCEDURE — 86900 BLOOD TYPING SEROLOGIC ABO: CPT

## 2019-02-06 PROCEDURE — 86780 TREPONEMA PALLIDUM: CPT

## 2019-02-06 PROCEDURE — 86850 RBC ANTIBODY SCREEN: CPT

## 2019-02-06 RX ORDER — INFLUENZA VIRUS VACCINE 15; 15; 15; 15 UG/.5ML; UG/.5ML; UG/.5ML; UG/.5ML
0.5 SUSPENSION INTRAMUSCULAR ONCE
Qty: 0 | Refills: 0 | Status: DISCONTINUED | OUTPATIENT
Start: 2019-02-06 | End: 2019-02-06

## 2019-02-06 RX ADMIN — Medication 1 TABLET(S): at 12:52

## 2019-02-06 RX ADMIN — Medication 500 MILLIGRAM(S): at 05:11

## 2019-02-06 RX ADMIN — Medication 600 MILLIGRAM(S): at 13:30

## 2019-02-06 RX ADMIN — Medication 600 MILLIGRAM(S): at 12:57

## 2019-02-06 RX ADMIN — HEPARIN SODIUM 5000 UNIT(S): 5000 INJECTION INTRAVENOUS; SUBCUTANEOUS at 05:11

## 2019-02-06 RX ADMIN — Medication 325 MILLIGRAM(S): at 05:10

## 2019-02-06 RX ADMIN — Medication 100 MILLIGRAM(S): at 12:57

## 2019-02-06 RX ADMIN — Medication 600 MILLIGRAM(S): at 05:45

## 2019-02-06 RX ADMIN — Medication 600 MILLIGRAM(S): at 05:10

## 2019-02-06 NOTE — PROGRESS NOTE ADULT - SUBJECTIVE AND OBJECTIVE BOX
Postpartum Note-  Section POD#3    Allergies    No Known Allergies      Blood type B   Positive    RPR Negative    Rubella: Immune    S:Patient is a  33y    P 2002   POD#3 S/P C/Sec  Subjective: Patient w/o complaints, pain is controlled.  Pt is OOB, tolerating PO, passing flatus, voiding. Lochia WNL.   Patient states she is no longer having HA, and feels well when ambulating    Feeding: Breast  O:  Vital Signs Last 24 Hrs  T(C): 36.6 (2019 05:12), Max: 36.9 (2019 22:02)  T(F): 97.8 (2019 05:12), Max: 98.4 (2019 22:02)  HR: 76 (2019 05:12) (76 - 100)  BP: 111/73 (2019 05:12) (111/73 - 138/88)  BP(mean): --  RR: 16 (2019 05:12) (16 - 18)  SpO2: 96% (2019 16:58) (96% - 100%)     Gen: NAD  Abdomen: +BS Soft, nontender, non-distended, fundus firm.  Incision: Clean, dry, and intact.  Negative erythema/edema/ecchymosis   Sub Q/Staples  Lochia WNL  Ext:  Neg Edema, Neg Calf tenderness    LABS:                          7.1    7.2   )-----------( 194      ( 2019 06:02 )             21.7       A/P:  33y  POD # 3 S/P  repeat/ primary   section, doing well    PMHx:  Current Issues:  PAST MEDICAL & SURGICAL HISTORY:  Macrosomia  No pertinent past medical history  Cyst of breast: excision   No significant past surgical history      MEDICATIONS  (STANDING):  acetaminophen   Tablet .. 975 milliGRAM(s) Oral every 6 hours  ascorbic acid 500 milliGRAM(s) Oral two times a day  dextrose 5% + lactated ringers. 500 milliLiter(s) (125 mL/Hr) IV Continuous <Continuous>  diphtheria/tetanus/pertussis (acellular) Vaccine (ADAcel) 0.5 milliLiter(s) IntraMuscular once  ferrous    sulfate 325 milliGRAM(s) Oral two times a day  heparin  Injectable 5000 Unit(s) SubCutaneous every 12 hours  ibuprofen  Tablet. 600 milliGRAM(s) Oral every 6 hours  lactated ringers. 1000 milliLiter(s) (125 mL/Hr) IV Continuous <Continuous>  oxyCODONE    IR 5 milliGRAM(s) Oral every 3 hours  oxytocin Infusion 333.333 milliUNIT(s)/Min (1000 mL/Hr) IV Continuous <Continuous>  oxytocin Infusion 41.667 milliUNIT(s)/Min (125 mL/Hr) IV Continuous <Continuous>  prenatal multivitamin 1 Tablet(s) Oral daily    MEDICATIONS  (PRN):  diphenhydrAMINE 25 milliGRAM(s) Oral every 6 hours PRN Itching  docusate sodium 100 milliGRAM(s) Oral two times a day PRN Stool Softening  glycerin Suppository - Adult 1 Suppository(s) Rectal at bedtime PRN Constipation  lanolin Ointment 1 Application(s) Topical every 3 hours PRN Sore Nipples  oxyCODONE    IR 5 milliGRAM(s) Oral every 4 hours PRN Severe Pain (7 - 10)  simethicone 80 milliGRAM(s) Chew every 4 hours PRN Gas      Increase OOB  Regular diet  AM CBC  PO Pain Protocol  Routine Postop/Postpartum care  Discharge Planning Postpartum Note-  Section POD#3    Allergies    No Known Allergies      Blood type B   Positive    RPR Negative    Rubella: Immune    S:Patient is a  33y    P 2002   POD#3 S/P C/Sec  Subjective: Patient w/o complaints, pain is controlled.  Pt is OOB, tolerating PO, passing flatus, voiding. Lochia WNL.   Patient states she is no longer having HA, and feels well when ambulating, denies complaints    Feeding: Breast  O:  Vital Signs Last 24 Hrs  T(C): 36.6 (2019 05:12), Max: 36.9 (2019 22:02)  T(F): 97.8 (2019 05:12), Max: 98.4 (2019 22:02)  HR: 76 (2019 05:12) (76 - 100)  BP: 111/73 (2019 05:12) (111/73 - 138/88)  RR: 16 (2019 05:12) (16 - 18)  SpO2: 96% (2019 16:58) (96% - 100%)     Gen: NAD  Abdomen: +BS Soft, nontender, non-distended, fundus firm.  Incision: Clean, dry, and intact.  Negative erythema/edema/ecchymosis   Sub Q/steri  Lochia WNL  Ext:  Neg Calf tenderness    LABS:                          7.1    7.2   )-----------( 194      ( 2019 06:02 )             21.7

## 2019-02-06 NOTE — PROGRESS NOTE ADULT - SUBJECTIVE AND OBJECTIVE BOX
Patient seen and examined at bedside, no acute overnight events. No acute complaints, pain well controlled. Patient is ambulating, voiding spontaneously, passing flatus, and tolerating regular diet. Pt is breast feeding her baby.  had bowel movement.  the headache is gone    Vital Signs Last 24 Hours  T(C): 36.6 (02-06-19 @ 05:12), Max: 36.9 (02-05-19 @ 22:02)  HR: 76 (02-06-19 @ 05:12) (76 - 100)  BP: 111/73 (02-06-19 @ 05:12) (111/73 - 138/88)  RR: 16 (02-06-19 @ 05:12) (16 - 18)  SpO2: 96% (02-05-19 @ 16:58) (96% - 100%)    Physical Exam:  General: NAD  Abdomen: Soft, non-tender, non-distended, fundus firm  Incision: Pfannenstiel incision CDI, subcuticular suture closure  Pelvic: Lochia wnl    Labs:    Blood Type: B Positive  Antibody Screen: --  RPR: Negative               7.1    7.2   )-----------( 194      ( 02-06 @ 06:02 )             21.7                8.4    9.6   )-----------( 221      ( 02-04 @ 14:33 )             25.4                7.3    8.2   )-----------( 190      ( 02-04 @ 06:06 )             21.7         MEDICATIONS  (STANDING):  acetaminophen   Tablet .. 975 milliGRAM(s) Oral every 6 hours  ascorbic acid 500 milliGRAM(s) Oral two times a day  dextrose 5% + lactated ringers. 500 milliLiter(s) (125 mL/Hr) IV Continuous <Continuous>  diphtheria/tetanus/pertussis (acellular) Vaccine (ADAcel) 0.5 milliLiter(s) IntraMuscular once  ferrous    sulfate 325 milliGRAM(s) Oral two times a day  heparin  Injectable 5000 Unit(s) SubCutaneous every 12 hours  ibuprofen  Tablet. 600 milliGRAM(s) Oral every 6 hours  lactated ringers. 1000 milliLiter(s) (125 mL/Hr) IV Continuous <Continuous>  oxyCODONE    IR 5 milliGRAM(s) Oral every 3 hours  oxytocin Infusion 333.333 milliUNIT(s)/Min (1000 mL/Hr) IV Continuous <Continuous>  oxytocin Infusion 41.667 milliUNIT(s)/Min (125 mL/Hr) IV Continuous <Continuous>  prenatal multivitamin 1 Tablet(s) Oral daily    MEDICATIONS  (PRN):  diphenhydrAMINE 25 milliGRAM(s) Oral every 6 hours PRN Itching  docusate sodium 100 milliGRAM(s) Oral two times a day PRN Stool Softening  glycerin Suppository - Adult 1 Suppository(s) Rectal at bedtime PRN Constipation  lanolin Ointment 1 Application(s) Topical every 3 hours PRN Sore Nipples  oxyCODONE    IR 5 milliGRAM(s) Oral every 4 hours PRN Severe Pain (7 - 10)  simethicone 80 milliGRAM(s) Chew every 4 hours PRN Gas

## 2019-02-06 NOTE — PROGRESS NOTE ADULT - ASSESSMENT
A/P:  33y     S/P  primary   section   POD # 1, doing well      PAST MEDICAL & SURGICAL HISTORY:  Macrosomia  No pertinent past medical history  Cyst of breast: excision   No significant past surgical history    Current Issues: none
A/P:  33y     S/P  primary   section   POD #2, doing well      PAST MEDICAL & SURGICAL HISTORY:  Macrosomia  No pertinent past medical history  Cyst of breast: excision   No significant past surgical history    Current Issues: none
post failed TOLAC, repeat lscs. with low H&H asymptomatic. . tolerating oral food.
post op day #2, post c/s with new finding of headache.  her bp is normal, no history of headache, no other symptoms.  aesthesia saw the patient tonight and r/o spinal headache. pt states now that she is drinking more fluid and coffee the headache is improving.  if patient still has headache tomorrow will ask neuro consult.  blood is sent for toxemia
post op day 3, with anemia, stable.  had headaches is better now.  is trying to breast feed.
A/P:  33y  POD # 3 S/P  repeat   section, doing well      Current Issues: none  PAST MEDICAL & SURGICAL HISTORY:  Macrosomia  No pertinent past medical history  Cyst of breast: excision   No significant past surgical history

## 2019-02-06 NOTE — PROGRESS NOTE ADULT - REASON FOR ADMISSION
Called to see patient to rule out post-dural puncture headache
induction of labor and delivery
induction of labor and delivery
induction of labor and repeat cesarian section
less than 40 yrs

## 2019-02-06 NOTE — PROGRESS NOTE ADULT - PROBLEM SELECTOR PLAN 1
Increase OOB  D/C IVF  D/C PCEA  DVT ppx  Regular diet  Routine Postpartum/Post-op care
Increase OOB  Renew IVF  Renew PCEA  DVT ppx  Dressing removed  Regular diet  AM CBC  Routine Postpartum/Post-op care    Karla Traylor PA-C
post partum care
post partum care
repeat c/s and post partum care
Increase OOB  Regular diet  Anemia - pt asymptomatic - Will repeat CBC in 4 hours and reevaluate  PO Pain Protocol  Routine Postop/Postpartum care  Discharge Planning    Karla Traylor PA-C

## 2019-02-06 NOTE — PROGRESS NOTE ADULT - PROBLEM SELECTOR PROBLEM 1
delivery delivered

## 2020-01-26 ENCOUNTER — TRANSCRIPTION ENCOUNTER (OUTPATIENT)
Age: 35
End: 2020-01-26

## 2020-02-05 ENCOUNTER — APPOINTMENT (OUTPATIENT)
Dept: GYNECOLOGIC ONCOLOGY | Facility: CLINIC | Age: 35
End: 2020-02-05
Payer: COMMERCIAL

## 2020-02-05 VITALS
SYSTOLIC BLOOD PRESSURE: 131 MMHG | HEIGHT: 60 IN | BODY MASS INDEX: 29.06 KG/M2 | DIASTOLIC BLOOD PRESSURE: 80 MMHG | WEIGHT: 148 LBS

## 2020-02-05 DIAGNOSIS — D56.3 THALASSEMIA MINOR: ICD-10-CM

## 2020-02-05 DIAGNOSIS — D21.9 BENIGN NEOPLASM OF CONNECTIVE AND OTHER SOFT TISSUE, UNSPECIFIED: ICD-10-CM

## 2020-02-05 DIAGNOSIS — N83.202 UNSPECIFIED OVARIAN CYST, LEFT SIDE: ICD-10-CM

## 2020-02-05 DIAGNOSIS — M79.646 PAIN IN UNSPECIFIED FINGER(S): ICD-10-CM

## 2020-02-05 DIAGNOSIS — Z34.90 ENCOUNTER FOR SUPERVISION OF NORMAL PREGNANCY, UNSPECIFIED, UNSPECIFIED TRIMESTER: ICD-10-CM

## 2020-02-05 PROCEDURE — 99204 OFFICE O/P NEW MOD 45 MIN: CPT

## 2020-08-03 ENCOUNTER — APPOINTMENT (OUTPATIENT)
Dept: PEDIATRIC CARDIOLOGY | Facility: CLINIC | Age: 35
End: 2020-08-03
Payer: COMMERCIAL

## 2020-08-03 PROCEDURE — 93325 DOPPLER ECHO COLOR FLOW MAPG: CPT

## 2020-08-03 PROCEDURE — 76827 ECHO EXAM OF FETAL HEART: CPT

## 2020-08-03 PROCEDURE — 99201 OFFICE OUTPATIENT NEW 10 MINUTES: CPT | Mod: 25

## 2020-08-03 PROCEDURE — 76825 ECHO EXAM OF FETAL HEART: CPT

## 2020-08-21 ENCOUNTER — APPOINTMENT (OUTPATIENT)
Dept: DISASTER EMERGENCY | Facility: CLINIC | Age: 35
End: 2020-08-21

## 2020-08-22 ENCOUNTER — TRANSCRIPTION ENCOUNTER (OUTPATIENT)
Age: 35
End: 2020-08-22

## 2020-08-23 ENCOUNTER — INPATIENT (INPATIENT)
Facility: HOSPITAL | Age: 35
LOS: 1 days | Discharge: HOME | End: 2020-08-25
Attending: OBSTETRICS & GYNECOLOGY | Admitting: OBSTETRICS & GYNECOLOGY
Payer: COMMERCIAL

## 2020-08-23 VITALS
RESPIRATION RATE: 14 BRPM | HEIGHT: 60 IN | WEIGHT: 163.14 LBS | OXYGEN SATURATION: 99 % | SYSTOLIC BLOOD PRESSURE: 131 MMHG | HEART RATE: 78 BPM | TEMPERATURE: 98 F | DIASTOLIC BLOOD PRESSURE: 76 MMHG

## 2020-08-23 DIAGNOSIS — N60.09 SOLITARY CYST OF UNSPECIFIED BREAST: Chronic | ICD-10-CM

## 2020-08-23 DIAGNOSIS — O34.219 MATERNAL CARE FOR UNSPECIFIED TYPE SCAR FROM PREVIOUS CESAREAN DELIVERY: ICD-10-CM

## 2020-08-23 LAB
BASOPHILS # BLD AUTO: 0 K/UL — SIGNIFICANT CHANGE UP (ref 0–0.2)
BASOPHILS NFR BLD AUTO: 0 % — SIGNIFICANT CHANGE UP (ref 0–2)
BLD GP AB SCN SERPL QL: NEGATIVE — SIGNIFICANT CHANGE UP
EOSINOPHIL # BLD AUTO: 0 K/UL — SIGNIFICANT CHANGE UP (ref 0–0.5)
EOSINOPHIL NFR BLD AUTO: 0 % — SIGNIFICANT CHANGE UP (ref 0–6)
HCT VFR BLD CALC: 37.4 % — SIGNIFICANT CHANGE UP (ref 34.5–45)
HGB BLD-MCNC: 11.4 G/DL — LOW (ref 11.5–15.5)
LYMPHOCYTES # BLD AUTO: 1.54 K/UL — SIGNIFICANT CHANGE UP (ref 1–3.3)
LYMPHOCYTES # BLD AUTO: 21 % — SIGNIFICANT CHANGE UP (ref 13–44)
MCHC RBC-ENTMCNC: 22.8 PG — LOW (ref 27–34)
MCHC RBC-ENTMCNC: 30.5 GM/DL — LOW (ref 32–36)
MCV RBC AUTO: 74.7 FL — LOW (ref 80–100)
MONOCYTES # BLD AUTO: 0.66 K/UL — SIGNIFICANT CHANGE UP (ref 0–0.9)
MONOCYTES NFR BLD AUTO: 9 % — SIGNIFICANT CHANGE UP (ref 2–14)
NEUTROPHILS # BLD AUTO: 5.07 K/UL — SIGNIFICANT CHANGE UP (ref 1.8–7.4)
NEUTROPHILS NFR BLD AUTO: 67 % — SIGNIFICANT CHANGE UP (ref 43–77)
PLATELET # BLD AUTO: 153 K/UL — SIGNIFICANT CHANGE UP (ref 150–400)
RBC # BLD: 5.01 M/UL — SIGNIFICANT CHANGE UP (ref 3.8–5.2)
RBC # FLD: 17 % — HIGH (ref 10.3–14.5)
RH IG SCN BLD-IMP: POSITIVE — SIGNIFICANT CHANGE UP
SARS-COV-2 IGG SERPL QL IA: NEGATIVE — SIGNIFICANT CHANGE UP
SARS-COV-2 IGM SERPL IA-ACNC: 0.09 INDEX — SIGNIFICANT CHANGE UP
WBC # BLD: 7.35 K/UL — SIGNIFICANT CHANGE UP (ref 3.8–10.5)
WBC # FLD AUTO: 7.35 K/UL — SIGNIFICANT CHANGE UP (ref 3.8–10.5)

## 2020-08-23 RX ORDER — METOCLOPRAMIDE HCL 10 MG
10 TABLET ORAL ONCE
Refills: 0 | Status: DISCONTINUED | OUTPATIENT
Start: 2020-08-23 | End: 2020-08-23

## 2020-08-23 RX ORDER — OXYTOCIN 10 UNIT/ML
333.33 VIAL (ML) INJECTION
Qty: 20 | Refills: 0 | Status: DISCONTINUED | OUTPATIENT
Start: 2020-08-23 | End: 2020-08-25

## 2020-08-23 RX ORDER — SODIUM CHLORIDE 9 MG/ML
1000 INJECTION, SOLUTION INTRAVENOUS ONCE
Refills: 0 | Status: COMPLETED | OUTPATIENT
Start: 2020-08-23 | End: 2020-08-23

## 2020-08-23 RX ORDER — OXYCODONE HYDROCHLORIDE 5 MG/1
5 TABLET ORAL
Refills: 0 | Status: DISCONTINUED | OUTPATIENT
Start: 2020-08-23 | End: 2020-08-25

## 2020-08-23 RX ORDER — MORPHINE SULFATE 50 MG/1
0.1 CAPSULE, EXTENDED RELEASE ORAL ONCE
Refills: 0 | Status: DISCONTINUED | OUTPATIENT
Start: 2020-08-23 | End: 2020-08-24

## 2020-08-23 RX ORDER — FAMOTIDINE 10 MG/ML
20 INJECTION INTRAVENOUS ONCE
Refills: 0 | Status: DISCONTINUED | OUTPATIENT
Start: 2020-08-23 | End: 2020-08-23

## 2020-08-23 RX ORDER — KETOROLAC TROMETHAMINE 30 MG/ML
30 SYRINGE (ML) INJECTION EVERY 6 HOURS
Refills: 0 | Status: COMPLETED | OUTPATIENT
Start: 2020-08-23 | End: 2020-08-24

## 2020-08-23 RX ORDER — NALOXONE HYDROCHLORIDE 4 MG/.1ML
0.1 SPRAY NASAL
Refills: 0 | Status: DISCONTINUED | OUTPATIENT
Start: 2020-08-23 | End: 2020-08-25

## 2020-08-23 RX ORDER — SODIUM CHLORIDE 9 MG/ML
1000 INJECTION, SOLUTION INTRAVENOUS
Refills: 0 | Status: DISCONTINUED | OUTPATIENT
Start: 2020-08-23 | End: 2020-08-25

## 2020-08-23 RX ORDER — SODIUM CHLORIDE 9 MG/ML
1000 INJECTION, SOLUTION INTRAVENOUS
Refills: 0 | Status: DISCONTINUED | OUTPATIENT
Start: 2020-08-23 | End: 2020-08-23

## 2020-08-23 RX ORDER — LANOLIN
1 OINTMENT (GRAM) TOPICAL EVERY 6 HOURS
Refills: 0 | Status: DISCONTINUED | OUTPATIENT
Start: 2020-08-23 | End: 2020-08-25

## 2020-08-23 RX ORDER — HYDROMORPHONE HYDROCHLORIDE 2 MG/ML
0.5 INJECTION INTRAMUSCULAR; INTRAVENOUS; SUBCUTANEOUS
Refills: 0 | Status: DISCONTINUED | OUTPATIENT
Start: 2020-08-23 | End: 2020-08-25

## 2020-08-23 RX ORDER — IBUPROFEN 200 MG
600 TABLET ORAL EVERY 6 HOURS
Refills: 0 | Status: COMPLETED | OUTPATIENT
Start: 2020-08-23 | End: 2021-07-22

## 2020-08-23 RX ORDER — HEPARIN SODIUM 5000 [USP'U]/ML
5000 INJECTION INTRAVENOUS; SUBCUTANEOUS EVERY 12 HOURS
Refills: 0 | Status: DISCONTINUED | OUTPATIENT
Start: 2020-08-23 | End: 2020-08-25

## 2020-08-23 RX ORDER — CEFAZOLIN SODIUM 1 G
2000 VIAL (EA) INJECTION ONCE
Refills: 0 | Status: DISCONTINUED | OUTPATIENT
Start: 2020-08-23 | End: 2020-08-25

## 2020-08-23 RX ORDER — ONDANSETRON 8 MG/1
4 TABLET, FILM COATED ORAL EVERY 6 HOURS
Refills: 0 | Status: DISCONTINUED | OUTPATIENT
Start: 2020-08-23 | End: 2020-08-25

## 2020-08-23 RX ORDER — OXYCODONE HYDROCHLORIDE 5 MG/1
10 TABLET ORAL
Refills: 0 | Status: DISCONTINUED | OUTPATIENT
Start: 2020-08-23 | End: 2020-08-25

## 2020-08-23 RX ORDER — MAGNESIUM HYDROXIDE 400 MG/1
30 TABLET, CHEWABLE ORAL
Refills: 0 | Status: DISCONTINUED | OUTPATIENT
Start: 2020-08-23 | End: 2020-08-25

## 2020-08-23 RX ORDER — SIMETHICONE 80 MG/1
80 TABLET, CHEWABLE ORAL EVERY 4 HOURS
Refills: 0 | Status: DISCONTINUED | OUTPATIENT
Start: 2020-08-23 | End: 2020-08-25

## 2020-08-23 RX ORDER — TETANUS TOXOID, REDUCED DIPHTHERIA TOXOID AND ACELLULAR PERTUSSIS VACCINE, ADSORBED 5; 2.5; 8; 8; 2.5 [IU]/.5ML; [IU]/.5ML; UG/.5ML; UG/.5ML; UG/.5ML
0.5 SUSPENSION INTRAMUSCULAR ONCE
Refills: 0 | Status: DISCONTINUED | OUTPATIENT
Start: 2020-08-23 | End: 2020-08-25

## 2020-08-23 RX ORDER — OXYCODONE HYDROCHLORIDE 5 MG/1
5 TABLET ORAL ONCE
Refills: 0 | Status: DISCONTINUED | OUTPATIENT
Start: 2020-08-23 | End: 2020-08-25

## 2020-08-23 RX ORDER — DIPHENHYDRAMINE HCL 50 MG
25 CAPSULE ORAL EVERY 6 HOURS
Refills: 0 | Status: DISCONTINUED | OUTPATIENT
Start: 2020-08-23 | End: 2020-08-25

## 2020-08-23 RX ORDER — CITRIC ACID/SODIUM CITRATE 300-500 MG
30 SOLUTION, ORAL ORAL ONCE
Refills: 0 | Status: DISCONTINUED | OUTPATIENT
Start: 2020-08-23 | End: 2020-08-23

## 2020-08-23 RX ORDER — ACETAMINOPHEN 500 MG
975 TABLET ORAL
Refills: 0 | Status: DISCONTINUED | OUTPATIENT
Start: 2020-08-23 | End: 2020-08-25

## 2020-08-23 RX ADMIN — Medication 25 MILLIGRAM(S): at 18:37

## 2020-08-23 RX ADMIN — HEPARIN SODIUM 5000 UNIT(S): 5000 INJECTION INTRAVENOUS; SUBCUTANEOUS at 23:56

## 2020-08-23 RX ADMIN — Medication 975 MILLIGRAM(S): at 21:55

## 2020-08-23 RX ADMIN — SODIUM CHLORIDE 2000 MILLILITER(S): 9 INJECTION, SOLUTION INTRAVENOUS at 11:39

## 2020-08-23 RX ADMIN — Medication 975 MILLIGRAM(S): at 22:20

## 2020-08-24 ENCOUNTER — TRANSCRIPTION ENCOUNTER (OUTPATIENT)
Age: 35
End: 2020-08-24

## 2020-08-24 LAB
APTT BLD: 107.8 SEC — HIGH (ref 27.5–35.5)
APTT BLD: 28.7 SEC — SIGNIFICANT CHANGE UP (ref 27.5–35.5)
FIBRINOGEN PPP-MCNC: 520 MG/DL — HIGH (ref 350–510)
FIBRINOGEN PPP-MCNC: 556 MG/DL — HIGH (ref 350–510)
HCT VFR BLD CALC: 34.2 % — LOW (ref 34.5–45)
HCT VFR BLD CALC: 34.4 % — LOW (ref 34.5–45)
HGB BLD-MCNC: 10.7 G/DL — LOW (ref 11.5–15.5)
HGB BLD-MCNC: 10.7 G/DL — LOW (ref 11.5–15.5)
INR BLD: 0.94 RATIO — SIGNIFICANT CHANGE UP (ref 0.88–1.16)
INR BLD: 1 RATIO — SIGNIFICANT CHANGE UP (ref 0.88–1.16)
MCHC RBC-ENTMCNC: 22.6 PG — LOW (ref 27–34)
MCHC RBC-ENTMCNC: 22.8 PG — LOW (ref 27–34)
MCHC RBC-ENTMCNC: 31.1 GM/DL — LOW (ref 32–36)
MCHC RBC-ENTMCNC: 31.3 GM/DL — LOW (ref 32–36)
MCV RBC AUTO: 72.7 FL — LOW (ref 80–100)
MCV RBC AUTO: 72.8 FL — LOW (ref 80–100)
NRBC # BLD: 0 /100 WBCS — SIGNIFICANT CHANGE UP (ref 0–0)
NRBC # BLD: 0 /100 WBCS — SIGNIFICANT CHANGE UP (ref 0–0)
PLATELET # BLD AUTO: 167 K/UL — SIGNIFICANT CHANGE UP (ref 150–400)
PLATELET # BLD AUTO: 177 K/UL — SIGNIFICANT CHANGE UP (ref 150–400)
PROTHROM AB SERPL-ACNC: 11.2 SEC — SIGNIFICANT CHANGE UP (ref 10.6–13.6)
PROTHROM AB SERPL-ACNC: 11.9 SEC — SIGNIFICANT CHANGE UP (ref 10.6–13.6)
RBC # BLD: 4.7 M/UL — SIGNIFICANT CHANGE UP (ref 3.8–5.2)
RBC # BLD: 4.73 M/UL — SIGNIFICANT CHANGE UP (ref 3.8–5.2)
RBC # FLD: 16.4 % — HIGH (ref 10.3–14.5)
RBC # FLD: 16.6 % — HIGH (ref 10.3–14.5)
T PALLIDUM AB TITR SER: NEGATIVE — SIGNIFICANT CHANGE UP
WBC # BLD: 11.11 K/UL — HIGH (ref 3.8–10.5)
WBC # BLD: 11.95 K/UL — HIGH (ref 3.8–10.5)
WBC # FLD AUTO: 11.11 K/UL — HIGH (ref 3.8–10.5)
WBC # FLD AUTO: 11.95 K/UL — HIGH (ref 3.8–10.5)

## 2020-08-24 RX ORDER — NALBUPHINE HYDROCHLORIDE 10 MG/ML
2.5 INJECTION, SOLUTION INTRAMUSCULAR; INTRAVENOUS; SUBCUTANEOUS EVERY 6 HOURS
Refills: 0 | Status: DISCONTINUED | OUTPATIENT
Start: 2020-08-24 | End: 2020-08-25

## 2020-08-24 RX ORDER — IBUPROFEN 200 MG
600 TABLET ORAL EVERY 6 HOURS
Refills: 0 | Status: DISCONTINUED | OUTPATIENT
Start: 2020-08-24 | End: 2020-08-25

## 2020-08-24 RX ADMIN — Medication 975 MILLIGRAM(S): at 08:58

## 2020-08-24 RX ADMIN — Medication 600 MILLIGRAM(S): at 13:00

## 2020-08-24 RX ADMIN — Medication 975 MILLIGRAM(S): at 15:06

## 2020-08-24 RX ADMIN — Medication 25 MILLIGRAM(S): at 00:48

## 2020-08-24 RX ADMIN — Medication 25 MILLIGRAM(S): at 13:21

## 2020-08-24 RX ADMIN — Medication 600 MILLIGRAM(S): at 23:33

## 2020-08-24 RX ADMIN — Medication 975 MILLIGRAM(S): at 20:45

## 2020-08-24 RX ADMIN — Medication 975 MILLIGRAM(S): at 09:58

## 2020-08-24 RX ADMIN — Medication 600 MILLIGRAM(S): at 17:32

## 2020-08-24 RX ADMIN — Medication 600 MILLIGRAM(S): at 06:05

## 2020-08-24 RX ADMIN — HEPARIN SODIUM 5000 UNIT(S): 5000 INJECTION INTRAVENOUS; SUBCUTANEOUS at 23:33

## 2020-08-24 RX ADMIN — Medication 975 MILLIGRAM(S): at 20:13

## 2020-08-24 RX ADMIN — Medication 600 MILLIGRAM(S): at 06:30

## 2020-08-24 RX ADMIN — Medication 600 MILLIGRAM(S): at 18:30

## 2020-08-24 RX ADMIN — Medication 975 MILLIGRAM(S): at 16:00

## 2020-08-24 RX ADMIN — Medication 600 MILLIGRAM(S): at 12:07

## 2020-08-24 RX ADMIN — HEPARIN SODIUM 5000 UNIT(S): 5000 INJECTION INTRAVENOUS; SUBCUTANEOUS at 12:59

## 2020-08-24 RX ADMIN — SIMETHICONE 80 MILLIGRAM(S): 80 TABLET, CHEWABLE ORAL at 06:42

## 2020-08-24 NOTE — CONSULT NOTE ADULT - ATTENDING COMMENTS
Agree with fellow note which I have edited where necessary. 33 yo F w/ no significant past medical history s/p successful , complicated by heparin given IV instead of subq. pTT is currently normal and she has not had any significant bleeding. Her hemoglobin is stable.   -continue to monitor clinically, heparin has a short half life so is likely been metabolized  -no indication for protamine   -will sign off, please re-consult if new questions arise

## 2020-08-24 NOTE — CONSULT NOTE ADULT - ASSESSMENT
A/P: 33 yo F w/ no significant past medical history who presented for delivery now s/p successful . Post op was accidentally given heparin 5000 U IV instead of subq, now out of system without significant bleeding complication.     -heparin very short acting with repeat coags this morning wnl suggesting heparin is out of system  -may have had therapeutic dosing for about 4 hours or so   -luckily no bleeding complications noted; at present, pt feels well   -continue to monitor CBC as needed; hemoglobin has remained stable   -continue routine post-op care per gyn team  -no further heme testing needed, please reconsult as needed    Joe Alvarez, PGY-6  Hematology-Oncology Fellow  [phone] ([place]) [phone] ([facility])

## 2020-08-24 NOTE — DISCHARGE NOTE OB - MATERIALS PROVIDED
Immunization Record/Guide to Postpartum Care/Breastfeeding Guide and Packet/Breastfeeding Log/Back To Sleep Handout

## 2020-08-24 NOTE — PROGRESS NOTE ADULT - SUBJECTIVE AND OBJECTIVE BOX
Patient seen and examined at bedside. Pt is s/p accidental heparin administration IV instead of subQ. Has been getting Qhr fundal checks, vitals, Is&Os, all which have been wnl. No acute complaints, pain well controlled. Patient is ambulating and tolerating regular diet. Has not yet passed flatus. Garibay is still in place. Denies CP, SOB, N/V, HA, blurred vision, epigastric pain.    Vital Signs Last 24 Hours  T(C): 36.6 (08-24-20 @ 06:00), Max: 36.8 (08-24-20 @ 01:16)  HR: 63 (08-24-20 @ 06:00) (46 - 78)  BP: 96/63 (08-24-20 @ 06:00) (92/62 - 131/76)  RR: 17 (08-24-20 @ 06:00) (14 - 18)  SpO2: 98% (08-24-20 @ 06:00) (95% - 99%)    I&O's Summary    23 Aug 2020 07:01  -  24 Aug 2020 06:33  --------------------------------------------------------  IN: 1675 mL / OUT: 3700 mL / NET: -2025 mL      Physical Exam:  General: NAD  Abdomen: Soft, expected tenderness, non-distended, fundus firm  Incision: Pfannenstiel incision CDI, steristrips in place  Pelvic: Lochia wnl    Labs:    Blood Type: B Positive  Antibody Screen: Negative  RPR: Negative               10.7   11.95 )-----------( 177      ( 08-24 @ 01:07 )             34.4                11.4   7.35  )-----------( 153      ( 08-23 @ 11:45 )             37.4         MEDICATIONS  (STANDING):  acetaminophen   Tablet .. 975 milliGRAM(s) Oral <User Schedule>  ceFAZolin   IVPB 2000 milliGRAM(s) IV Intermittent once  diphtheria/tetanus/pertussis (acellular) Vaccine (ADAcel) 0.5 milliLiter(s) IntraMuscular once  heparin   Injectable 5000 Unit(s) SubCutaneous every 12 hours  ibuprofen  Tablet. 600 milliGRAM(s) Oral every 6 hours  ketorolac   Injectable 30 milliGRAM(s) IV Push every 6 hours  lactated ringers. 1000 milliLiter(s) (125 mL/Hr) IV Continuous <Continuous>  morphine PF Spinal 0.1 milliGRAM(s) IntraThecal. once  oxytocin Infusion 333.333 milliUNIT(s)/Min (1000 mL/Hr) IV Continuous <Continuous>  oxytocin Infusion 333.333 milliUNIT(s)/Min (1000 mL/Hr) IV Continuous <Continuous>    MEDICATIONS  (PRN):  diphenhydrAMINE 25 milliGRAM(s) Oral every 6 hours PRN Itching  HYDROmorphone  Injectable 0.5 milliGRAM(s) IV Push every 3 hours PRN Severe Pain (7 - 10)  lanolin Ointment 1 Application(s) Topical every 6 hours PRN Sore Nipples  magnesium hydroxide Suspension 30 milliLiter(s) Oral two times a day PRN Constipation  naloxone Injectable 0.1 milliGRAM(s) IV Push every 3 minutes PRN For ANY of the following changes in patient status:  A. RR LESS THAN 10 breaths per minute, B. Oxygen saturation LESS THAN 90%, C. Sedation score of 6  ondansetron Injectable 4 milliGRAM(s) IV Push every 6 hours PRN Nausea  oxyCODONE    IR 5 milliGRAM(s) Oral every 3 hours PRN Moderate to Severe Pain (4-10)  oxyCODONE    IR 5 milliGRAM(s) Oral once PRN Moderate to Severe Pain (4-10)  oxyCODONE    IR 5 milliGRAM(s) Oral every 3 hours PRN Mild Pain (1 - 3)  oxyCODONE    IR 10 milliGRAM(s) Oral every 3 hours PRN Moderate Pain (4 - 6)  simethicone 80 milliGRAM(s) Chew every 4 hours PRN Gas

## 2020-08-24 NOTE — PROVIDER CONTACT NOTE (MEDICATION) - ASSESSMENT
Fundus firm, bleeding scant. No signs and symptoms of distress noted. v/s BP93/60, HR 63, Pulse ox 96, temp 98.0

## 2020-08-24 NOTE — PROVIDER CONTACT NOTE (MEDICATION) - ACTION/TREATMENT ORDERED:
Md Lopez at bedside to assess pt and ordered  q 1 hr fundal checks, i&o, vital signs x 4 hrs  Am cbc and coags ordered

## 2020-08-24 NOTE — DISCHARGE NOTE OB - CARE PROVIDER_API CALL
Nahum White J  OBSTETRICS AND GYNECOLOGY  [address], [address]  [place] [address]  Phone: [phone]  Fax: [phone]  Established Patient  Follow Up Time: 2 weeks

## 2020-08-24 NOTE — CHART NOTE - NSCHARTNOTEFT_GEN_A_CORE
Called by primary team regards to heparin injection.    This is a 33 y/o lady, who presented for C-sec delivery. Patient had non-complicated delivery at 3PM. Patient received 5000U of heparin through IV about 20~30 mins ago. Hematology was called for the guidance of management.    As per primary team, patient's V/S is stable and asymptomatic. No evidence of external or internal bleeding in physical exam.    Vital Signs Last 24 Hrs  T(C): 36.7 (24 Aug 2020 00:10), Max: 36.7 (23 Aug 2020 14:38)  T(F): 98 (24 Aug 2020 00:10), Max: 98 (24 Aug 2020 00:10)  HR: 63 (24 Aug 2020 00:10) (46 - 78)  BP: 93/60 (24 Aug 2020 00:10) (93/60 - 131/76)  BP(mean): 83 (23 Aug 2020 19:00) (83 - 94)  RR: 18 (24 Aug 2020 00:10) (14 - 18)  SpO2: 96% (24 Aug 2020 00:10) (95% - 99%)                        11.4   7.35  )-----------( 153      ( 23 Aug 2020 11:45 )             37.4      Recommendations:  Heparin's half-life is usually short and expected to be degraded in the system relatively rapid.  5000U is about the loading dose for full AC for this patient based on her weight. We suspect relatively low risk of bleeding from IV injection.  - trend V/S and serial exam overnight  - trend CBC and coag q4hr until PTT normalizes  - no need of reversing with protamine for now as there is no evidence of active bleeding    Hong-in Yanick, PGY-4  Translational Medical Oncology Fellow  [phone]  Case d/w Dr. Greenwood, attending physician on call

## 2020-08-24 NOTE — CHART NOTE - NSCHARTNOTEFT_GEN_A_CORE
R1 Event Note    S:  Called by patient's nurse stating that nurse accidentally gave pt heparin through her IV instead of subQ. Nurse meant to give pt IV toradol, but mixed up the vials. Examined pt at bedside and explained to her the event that occurred, describing the potential risks of this incident including increased bleeding. Ensured patient that the OB team, nursing, and hematology aware and will be monitoring pts status closely. Pt understands and is aware of the incident. Pt denies pain, lightheadedness, or recent increase in vaginal bleeding.     O:  VS  T(C): 36.7 (20 @ 00:10)  HR: 63 (20 @ 00:10)  BP: 93/60 (20 @ 00:10)  RR: 18 (20 @ 00:10)  SpO2: 96% (20 @ 00:10)    I&O's Detail    23 Aug 2020 07:01  -  24 Aug 2020 00:43  --------------------------------------------------------  IN:    oxytocin Infusion: 1000 mL  Total IN: 1000 mL    OUT:    Estimated Blood Loss: 800 mL    Indwelling Catheter - Urethral: 950 mL    Voided: 400 mL  Total OUT: 2150 mL    Total NET: -1150 mL                          11.4   7.35  )-----------( 153      ( 23 Aug 2020 11:45 )             37.4       PE:  General: NAD  Resp: nonlabored breathing  Abdomen: expected tenderness, nondistended  Incision: covered by bandage  Pelvic: lochia wnl      A/P: Pt is a 33yo  POD1 from rLTCS s/p accidental injection of subQ heparin dose through IV, currently in stable condition.    -discussed incident with patient, counseled her about risks of bleeding   -OB team, nurse management, hematology aware  -fundal checks, strict Is & Os, vitals hourly  -perez to remain in place  -hematology consulted--suggests to obtain stat CBC & coags for monitoring, heme to write note regarding consult  -stat CBC & coags ordered  -am CBC & coags ordered    Nic Lopez, PGY1  D/w Dr. Deena Rubi,  R1 Event Note    S:  Called by patient's nurse stating that nurse accidentally gave pt heparin through her IV instead of subQ. Nurse meant to give pt IV toradol, but mixed up the vials. Examined pt at bedside and explained to her the event that occurred, describing the potential risks of this incident including increased bleeding. Ensured patient that the OB team, nursing, and hematology aware and will be monitoring pts status closely. Pt understands and is aware of the incident. Pt denies pain, lightheadedness, or recent increase in vaginal bleeding.     O:  VS  T(C): 36.7 (20 @ 00:10)  HR: 63 (20 @ 00:10)  BP: 93/60 (20 @ 00:10)  RR: 18 (20 @ 00:10)  SpO2: 96% (20 @ 00:10)    I&O's Detail    23 Aug 2020 07:01  -  24 Aug 2020 00:43  --------------------------------------------------------  IN:    oxytocin Infusion: 1000 mL  Total IN: 1000 mL    OUT:    Estimated Blood Loss: 800 mL    Indwelling Catheter - Urethral: 950 mL    Voided: 400 mL  Total OUT: 2150 mL    Total NET: -1150 mL                          11.4   7.35  )-----------( 153      ( 23 Aug 2020 11:45 )             37.4       PE:  General: NAD  Resp: nonlabored breathing  Abdomen: expected tenderness, nondistended  Incision: covered by bandage  Pelvic: lochia wnl      A/P: Pt is a 35yo  POD1 from rLTCS s/p accidental injection of subQ heparin dose through IV, currently in stable condition.    -discussed incident with patient, counseled her about risks of bleeding   -OB team, nurse management, hematology aware  -fundal checks, strict Is & Os, vitals hourly  -perez to remain in place  -hematology consulted--suggests to obtain stat CBC & coags for monitoring, heme to write note regarding consult  -stat CBC & coags ordered  -am CBC & coags ordered    Nic Lopez, PGY1  D/w Dr. Deena Rubi,   -  OB attg note    Patient discussed with resident. Agree with plan as outlined above.    Nimco JETT

## 2020-08-24 NOTE — PROGRESS NOTE ADULT - SUBJECTIVE AND OBJECTIVE BOX
Day 1 of Anesthesia Pain Management Service    SUBJECTIVE: Doing ok  Pain Scale Score:          [X] Refer to charted pain scores    THERAPY:    s/p    100 mcg PF morphine on 8/23/2020      MEDICATIONS  (STANDING):  acetaminophen   Tablet .. 975 milliGRAM(s) Oral <User Schedule>  ceFAZolin   IVPB 2000 milliGRAM(s) IV Intermittent once  diphtheria/tetanus/pertussis (acellular) Vaccine (ADAcel) 0.5 milliLiter(s) IntraMuscular once  heparin   Injectable 5000 Unit(s) SubCutaneous every 12 hours  ibuprofen  Tablet. 600 milliGRAM(s) Oral every 6 hours  ketorolac   Injectable 30 milliGRAM(s) IV Push every 6 hours  lactated ringers. 1000 milliLiter(s) (125 mL/Hr) IV Continuous <Continuous>  morphine PF Spinal 0.1 milliGRAM(s) IntraThecal. once  oxytocin Infusion 333.333 milliUNIT(s)/Min (1000 mL/Hr) IV Continuous <Continuous>  oxytocin Infusion 333.333 milliUNIT(s)/Min (1000 mL/Hr) IV Continuous <Continuous>    MEDICATIONS  (PRN):  diphenhydrAMINE 25 milliGRAM(s) Oral every 6 hours PRN Itching  HYDROmorphone  Injectable 0.5 milliGRAM(s) IV Push every 3 hours PRN Severe Pain (7 - 10)  lanolin Ointment 1 Application(s) Topical every 6 hours PRN Sore Nipples  magnesium hydroxide Suspension 30 milliLiter(s) Oral two times a day PRN Constipation  nalbuphine Injectable 2.5 milliGRAM(s) IV Push every 6 hours PRN Pruritus  naloxone Injectable 0.1 milliGRAM(s) IV Push every 3 minutes PRN For ANY of the following changes in patient status:  A. RR LESS THAN 10 breaths per minute, B. Oxygen saturation LESS THAN 90%, C. Sedation score of 6  ondansetron Injectable 4 milliGRAM(s) IV Push every 6 hours PRN Nausea  oxyCODONE    IR 5 milliGRAM(s) Oral every 3 hours PRN Moderate to Severe Pain (4-10)  oxyCODONE    IR 5 milliGRAM(s) Oral once PRN Moderate to Severe Pain (4-10)  oxyCODONE    IR 5 milliGRAM(s) Oral every 3 hours PRN Mild Pain (1 - 3)  oxyCODONE    IR 10 milliGRAM(s) Oral every 3 hours PRN Moderate Pain (4 - 6)  simethicone 80 milliGRAM(s) Chew every 4 hours PRN Gas      OBJECTIVE:    Sedation:        	[X] Alert	 [ ] Drowsy	[ ] Arousable      [ ] Asleep       [ ] Unresponsive    Side Effects:	[ ] None 	[ ] Nausea	[ ] Vomiting         [ X] Pruritus: some relief with Benadryl- add nubaine prn  		[ ] Weakness            [ ] Numbness	          [ ] Other:    Vital Signs Last 24 Hrs  T(C): 36.6 (24 Aug 2020 06:00), Max: 36.8 (24 Aug 2020 01:16)  T(F): 97.9 (24 Aug 2020 06:00), Max: 98.2 (24 Aug 2020 01:16)  HR: 63 (24 Aug 2020 06:00) (46 - 78)  BP: 96/63 (24 Aug 2020 06:00) (92/62 - 131/76)  BP(mean): 83 (23 Aug 2020 19:00) (83 - 94)  RR: 17 (24 Aug 2020 06:00) (14 - 18)  SpO2: 98% (24 Aug 2020 06:00) (95% - 99%)    ASSESSMENT/ PLAN  [X] Patient transitioned to prn analgesics  [X] Pain management per primary service, pain service to sign off   [X]Documentation and Verification of current medications Day 1 of Anesthesia Pain Management Service    SUBJECTIVE: Doing ok  Pain Scale Score:          [X] Refer to charted pain scores    THERAPY:    s/p    100 mcg PF morphine on 8/23/2020      MEDICATIONS  (STANDING):  acetaminophen   Tablet .. 975 milliGRAM(s) Oral <User Schedule>  ceFAZolin   IVPB 2000 milliGRAM(s) IV Intermittent once  diphtheria/tetanus/pertussis (acellular) Vaccine (ADAcel) 0.5 milliLiter(s) IntraMuscular once  heparin   Injectable 5000 Unit(s) SubCutaneous every 12 hours  ibuprofen  Tablet. 600 milliGRAM(s) Oral every 6 hours  ketorolac   Injectable 30 milliGRAM(s) IV Push every 6 hours  lactated ringers. 1000 milliLiter(s) (125 mL/Hr) IV Continuous <Continuous>  morphine PF Spinal 0.1 milliGRAM(s) IntraThecal. once  oxytocin Infusion 333.333 milliUNIT(s)/Min (1000 mL/Hr) IV Continuous <Continuous>  oxytocin Infusion 333.333 milliUNIT(s)/Min (1000 mL/Hr) IV Continuous <Continuous>    MEDICATIONS  (PRN):  diphenhydrAMINE 25 milliGRAM(s) Oral every 6 hours PRN Itching  HYDROmorphone  Injectable 0.5 milliGRAM(s) IV Push every 3 hours PRN Severe Pain (7 - 10)  lanolin Ointment 1 Application(s) Topical every 6 hours PRN Sore Nipples  magnesium hydroxide Suspension 30 milliLiter(s) Oral two times a day PRN Constipation  nalbuphine Injectable 2.5 milliGRAM(s) IV Push every 6 hours PRN Pruritus  naloxone Injectable 0.1 milliGRAM(s) IV Push every 3 minutes PRN For ANY of the following changes in patient status:  A. RR LESS THAN 10 breaths per minute, B. Oxygen saturation LESS THAN 90%, C. Sedation score of 6  ondansetron Injectable 4 milliGRAM(s) IV Push every 6 hours PRN Nausea  oxyCODONE    IR 5 milliGRAM(s) Oral every 3 hours PRN Moderate to Severe Pain (4-10)  oxyCODONE    IR 5 milliGRAM(s) Oral once PRN Moderate to Severe Pain (4-10)  oxyCODONE    IR 5 milliGRAM(s) Oral every 3 hours PRN Mild Pain (1 - 3)  oxyCODONE    IR 10 milliGRAM(s) Oral every 3 hours PRN Moderate Pain (4 - 6)  simethicone 80 milliGRAM(s) Chew every 4 hours PRN Gas      OBJECTIVE:    Sedation:        	[X] Alert	 [ ] Drowsy	[ ] Arousable      [ ] Asleep       [ ] Unresponsive    Side Effects:	[ ] None 	[ ] Nausea	[ ] Vomiting         [ X] Pruritus: some relief with Benadryl- add Nubain prn  		[ ] Weakness            [ ] Numbness	          [ ] Other:    Vital Signs Last 24 Hrs  T(C): 36.6 (24 Aug 2020 06:00), Max: 36.8 (24 Aug 2020 01:16)  T(F): 97.9 (24 Aug 2020 06:00), Max: 98.2 (24 Aug 2020 01:16)  HR: 63 (24 Aug 2020 06:00) (46 - 78)  BP: 96/63 (24 Aug 2020 06:00) (92/62 - 131/76)  BP(mean): 83 (23 Aug 2020 19:00) (83 - 94)  RR: 17 (24 Aug 2020 06:00) (14 - 18)  SpO2: 98% (24 Aug 2020 06:00) (95% - 99%)    ASSESSMENT/ PLAN  [X] Patient transitioned to prn analgesics  [X] Pain management per primary service, pain service to sign off   [X]Documentation and Verification of current medications

## 2020-08-24 NOTE — CONSULT NOTE ADULT - SUBJECTIVE AND OBJECTIVE BOX
HPI:  33 yo F w/ no significant past medical history who presented for delivery now s/p successful . She was accidentally given 5000U heparin IV instead of subq after procedure intended for DVT prophylaxis. She denies any bleeding or bruising. Has no personal or family history of bleeding issues. Hemoglobin has remained stable and coags have normalized now as well. At present, pt has no complaints. Tolerating regular diet and feels well.       14 point ROS otherwise negative    PAST MEDICAL & SURGICAL HISTORY:  Macrosomia  No pertinent past medical history  Cyst of breast: excision   No significant past surgical history      Allergies    No Known Allergies    Intolerances        MEDICATIONS  (STANDING):  acetaminophen   Tablet .. 975 milliGRAM(s) Oral <User Schedule>  ceFAZolin   IVPB 2000 milliGRAM(s) IV Intermittent once  diphtheria/tetanus/pertussis (acellular) Vaccine (ADAcel) 0.5 milliLiter(s) IntraMuscular once  heparin   Injectable 5000 Unit(s) SubCutaneous every 12 hours  ibuprofen  Tablet. 600 milliGRAM(s) Oral every 6 hours  ketorolac   Injectable 30 milliGRAM(s) IV Push every 6 hours  lactated ringers. 1000 milliLiter(s) (125 mL/Hr) IV Continuous <Continuous>  morphine PF Spinal 0.1 milliGRAM(s) IntraThecal. once  oxytocin Infusion 333.333 milliUNIT(s)/Min (1000 mL/Hr) IV Continuous <Continuous>  oxytocin Infusion 333.333 milliUNIT(s)/Min (1000 mL/Hr) IV Continuous <Continuous>    MEDICATIONS  (PRN):  diphenhydrAMINE 25 milliGRAM(s) Oral every 6 hours PRN Itching  HYDROmorphone  Injectable 0.5 milliGRAM(s) IV Push every 3 hours PRN Severe Pain (7 - 10)  lanolin Ointment 1 Application(s) Topical every 6 hours PRN Sore Nipples  magnesium hydroxide Suspension 30 milliLiter(s) Oral two times a day PRN Constipation  nalbuphine Injectable 2.5 milliGRAM(s) IV Push every 6 hours PRN Pruritus  naloxone Injectable 0.1 milliGRAM(s) IV Push every 3 minutes PRN For ANY of the following changes in patient status:  A. RR LESS THAN 10 breaths per minute, B. Oxygen saturation LESS THAN 90%, C. Sedation score of 6  ondansetron Injectable 4 milliGRAM(s) IV Push every 6 hours PRN Nausea  oxyCODONE    IR 5 milliGRAM(s) Oral every 3 hours PRN Moderate to Severe Pain (4-10)  oxyCODONE    IR 5 milliGRAM(s) Oral once PRN Moderate to Severe Pain (4-10)  oxyCODONE    IR 5 milliGRAM(s) Oral every 3 hours PRN Mild Pain (1 - 3)  oxyCODONE    IR 10 milliGRAM(s) Oral every 3 hours PRN Moderate Pain (4 - 6)  simethicone 80 milliGRAM(s) Chew every 4 hours PRN Gas      FAMILY HISTORY:  No bleeding issues.     SOCIAL HISTORY: No EtOH, no tobacco    Height (cm): 152.4 ( @ 21:55)  Weight (kg): 74.4 ( @ 21:55)  BMI (kg/m2): 32 ( @ 21:55)  BSA (m2): 1.72 ( @ 21:55)    VITALS:   T(F): 97.7 (20 @ 09:54), Max: 98.2 (20 @ 01:16)  HR: 75 (20 @ 09:54)  BP: 100/68 (20 @ 09:54)  RR: 18 (20 @ 09:54)  SpO2: 97% (20 @ 09:54)  Wt(kg): --    PHYSICAL EXAM    GENERAL: NAD, well-developed  HEAD:  Atraumatic, Normocephalic  EYES: EOMI, PERRLA, conjunctiva and sclera clear  NECK: Supple, No JVD  CHEST/LUNG: Clear to auscultation bilaterally; No wheeze  HEART: Regular rate and rhythm; No murmurs, rubs, or gallops  ABDOMEN: Soft, Nontender, Nondistended; Bowel sounds present  EXTREMITIES:  No clubbing, cyanosis, or edema  NEUROLOGY: non-focal  SKIN: No rashes or lesions; no bruising noted.     LABS:                         10.7   11.11 )-----------( 167      ( 24 Aug 2020 06:48 )             34.2             PT/INR - ( 24 Aug 2020 06:51 )   PT: 11.2 sec;   INR: 0.94 ratio         PTT - ( 24 Aug 2020 06:51 )  PTT:28.7 sec      IMAGING:

## 2020-08-24 NOTE — DISCHARGE NOTE OB - CARE PLAN
Principal Discharge DX:	 delivery delivered  Goal:	post op care  Assessment and plan of treatment:	repeat c/s is done

## 2020-08-24 NOTE — DISCHARGE NOTE OB - MEDICATION SUMMARY - MEDICATIONS TO TAKE
I will START or STAY ON the medications listed below when I get home from the hospital:    ibuprofen 600 mg oral tablet  -- 1 tab(s) by mouth every 6 hours, As needed, Mild pain or headache  -- Indication: For for moderate pain    Actamin 325 mg oral tablet  -- 3 tab(s) by mouth every 6 hours  -- Indication: For for sever pain

## 2020-08-24 NOTE — PROGRESS NOTE ADULT - PROBLEM SELECTOR PLAN 1
- monitor pts bleeding & VS  - trend CBC and coag q4hr until PTT normalizes, as per hematology  - Continue with po analgesia  - Increase ambulation  - Continue regular diet  - d/c IV fluids  - Check CBC & coags  - D/c Garibay  - Incision dressing removed    Nic Lopez, PGY1

## 2020-08-24 NOTE — PROGRESS NOTE ADULT - SUBJECTIVE AND OBJECTIVE BOX
Patient seen and examined at bedside, no acute overnight events. No acute complaints, pain well controlled. Patient is ambulating and tolerating regular diet. Has passed flatus. Pt is breast feeding her baby.  on regullar diet want to go home tomorrow.  no vaginal bleeding, not light headed.    Vital Signs Last 24 Hours  T(C): 37.1 (08-24-20 @ 13:32), Max: 37.1 (08-24-20 @ 13:32)  HR: 65 (08-24-20 @ 13:32) (46 - 78)  BP: 96/71 (08-24-20 @ 13:32) (92/62 - 131/76)  RR: 18 (08-24-20 @ 13:32) (14 - 18)  SpO2: 99% (08-24-20 @ 13:32) (95% - 99%)    I&O's Summary    23 Aug 2020 07:01  -  24 Aug 2020 07:00  --------------------------------------------------------  IN: 1675 mL / OUT: 3700 mL / NET: -2025 mL    24 Aug 2020 07:01  -  24 Aug 2020 14:14  --------------------------------------------------------  IN: 0 mL / OUT: 150 mL / NET: -150 mL        Physical Exam:  General: NAD  Abdomen: Soft, non-tender, non-distended, fundus firm  Incision: Pfannenstiel incision CDI, subcuticular suture closure  Pelvic: Lochia wnl    Labs:    Blood Type: B Positive  Antibody Screen: Negative  RPR: Negative               10.7   11.11 )-----------( 167      ( 08-24 @ 06:48 )             34.2                10.7   11.95 )-----------( 177      ( 08-24 @ 01:07 )             34.4                11.4   7.35  )-----------( 153      ( 08-23 @ 11:45 )             37.4         MEDICATIONS  (STANDING):  acetaminophen   Tablet .. 975 milliGRAM(s) Oral <User Schedule>  ceFAZolin   IVPB 2000 milliGRAM(s) IV Intermittent once  diphtheria/tetanus/pertussis (acellular) Vaccine (ADAcel) 0.5 milliLiter(s) IntraMuscular once  heparin   Injectable 5000 Unit(s) SubCutaneous every 12 hours  ibuprofen  Tablet. 600 milliGRAM(s) Oral every 6 hours  lactated ringers. 1000 milliLiter(s) (125 mL/Hr) IV Continuous <Continuous>  morphine PF Spinal 0.1 milliGRAM(s) IntraThecal. once  oxytocin Infusion 333.333 milliUNIT(s)/Min (1000 mL/Hr) IV Continuous <Continuous>  oxytocin Infusion 333.333 milliUNIT(s)/Min (1000 mL/Hr) IV Continuous <Continuous>    MEDICATIONS  (PRN):  diphenhydrAMINE 25 milliGRAM(s) Oral every 6 hours PRN Itching  HYDROmorphone  Injectable 0.5 milliGRAM(s) IV Push every 3 hours PRN Severe Pain (7 - 10)  lanolin Ointment 1 Application(s) Topical every 6 hours PRN Sore Nipples  magnesium hydroxide Suspension 30 milliLiter(s) Oral two times a day PRN Constipation  nalbuphine Injectable 2.5 milliGRAM(s) IV Push every 6 hours PRN Pruritus  naloxone Injectable 0.1 milliGRAM(s) IV Push every 3 minutes PRN For ANY of the following changes in patient status:  A. RR LESS THAN 10 breaths per minute, B. Oxygen saturation LESS THAN 90%, C. Sedation score of 6  ondansetron Injectable 4 milliGRAM(s) IV Push every 6 hours PRN Nausea  oxyCODONE    IR 5 milliGRAM(s) Oral every 3 hours PRN Moderate to Severe Pain (4-10)  oxyCODONE    IR 5 milliGRAM(s) Oral once PRN Moderate to Severe Pain (4-10)  oxyCODONE    IR 5 milliGRAM(s) Oral every 3 hours PRN Mild Pain (1 - 3)  oxyCODONE    IR 10 milliGRAM(s) Oral every 3 hours PRN Moderate Pain (4 - 6)  simethicone 80 milliGRAM(s) Chew every 4 hours PRN Gas

## 2020-08-25 VITALS
OXYGEN SATURATION: 99 % | SYSTOLIC BLOOD PRESSURE: 113 MMHG | RESPIRATION RATE: 18 BRPM | HEART RATE: 77 BPM | DIASTOLIC BLOOD PRESSURE: 77 MMHG | TEMPERATURE: 98 F

## 2020-08-25 PROCEDURE — 86769 SARS-COV-2 COVID-19 ANTIBODY: CPT

## 2020-08-25 PROCEDURE — 59025 FETAL NON-STRESS TEST: CPT

## 2020-08-25 PROCEDURE — 86850 RBC ANTIBODY SCREEN: CPT

## 2020-08-25 PROCEDURE — 86900 BLOOD TYPING SEROLOGIC ABO: CPT

## 2020-08-25 PROCEDURE — 86780 TREPONEMA PALLIDUM: CPT

## 2020-08-25 PROCEDURE — 86901 BLOOD TYPING SEROLOGIC RH(D): CPT

## 2020-08-25 PROCEDURE — 85384 FIBRINOGEN ACTIVITY: CPT

## 2020-08-25 PROCEDURE — 85610 PROTHROMBIN TIME: CPT

## 2020-08-25 PROCEDURE — 85027 COMPLETE CBC AUTOMATED: CPT

## 2020-08-25 PROCEDURE — 59050 FETAL MONITOR W/REPORT: CPT

## 2020-08-25 PROCEDURE — 85730 THROMBOPLASTIN TIME PARTIAL: CPT

## 2020-08-25 RX ADMIN — Medication 600 MILLIGRAM(S): at 00:10

## 2020-08-25 RX ADMIN — Medication 975 MILLIGRAM(S): at 03:01

## 2020-08-25 RX ADMIN — Medication 975 MILLIGRAM(S): at 02:31

## 2020-08-25 RX ADMIN — Medication 975 MILLIGRAM(S): at 09:29

## 2020-08-25 RX ADMIN — Medication 600 MILLIGRAM(S): at 05:18

## 2020-08-25 RX ADMIN — Medication 600 MILLIGRAM(S): at 11:36

## 2020-08-25 RX ADMIN — Medication 600 MILLIGRAM(S): at 05:50

## 2020-08-25 RX ADMIN — Medication 975 MILLIGRAM(S): at 10:25

## 2020-08-25 RX ADMIN — HEPARIN SODIUM 5000 UNIT(S): 5000 INJECTION INTRAVENOUS; SUBCUTANEOUS at 11:36

## 2020-08-25 NOTE — PROGRESS NOTE ADULT - PROBLEM SELECTOR PLAN 1
- Continue with po analgesia  - Increase ambulation  - Continue regular diet  - IV lock  - No labs    Nic Lopez,PGY1

## 2020-08-25 NOTE — PROGRESS NOTE ADULT - ASSESSMENT
Day 1 of Anesthesia Pain Management Service    SUBJECTIVE:  Pain Scale Score:          [X] Refer to charted pain scores    THERAPY: Received PF spinal morphine as above    OBJECTIVE:    Sedation:        	[X] Alert	[ ] Drowsy	[ ] Arousable      [ ] Asleep       [ ] Unresponsive    Side Effects:	[X] None	[ ] Nausea	[ ] Vomiting         [ ] Pruritus  		[ ] Weakness            [ ] Numbness	          [ ] Other:    ASSESSMENT/ PLAN  [X] Patient transitioned to prn analgesics  [X] Pain management per primary service, pain service to sign off   [X]Documentation and Verification of current medications
Pt is a 33yo  POD1 from rLTCS s/p accidental injection of subQ heparin dose through IV, currently in stable condition.
Pt is a 33yo  POD2 from rLTCS s/p accidental injection of subQ heparin dose through IV, currently in stable condition.
post op day #1, post repeat c/s doing well.  voiding and eating.

## 2020-08-25 NOTE — PROGRESS NOTE ADULT - SUBJECTIVE AND OBJECTIVE BOX
Patient seen and examined at bedside, no acute overnight events. No acute complaints, pain well controlled. Patient is ambulating, voiding spontaneously, passing flatus, and tolerating regular diet. Denies CP, SOB, N/V, HA, blurred vision, epigastric pain.    Vital Signs Last 24 Hours  T(C): 36.7 (08-25-20 @ 05:05), Max: 37.1 (08-24-20 @ 13:32)  HR: 74 (08-25-20 @ 05:05) (65 - 79)  BP: 100/64 (08-25-20 @ 05:05) (96/71 - 113/77)  RR: 17 (08-25-20 @ 05:05) (17 - 18)  SpO2: 98% (08-25-20 @ 05:05) (97% - 99%)    Physical Exam:  General: NAD  Abdomen: Soft, expected tenderness, non-distended, fundus firm  Incision: Pfannenstiel incision CDI, steristrips in place  Pelvic: Lochia wnl    Labs:    Blood Type: B Positive  Antibody Screen: Negative  RPR: Negative               10.7   11.11 )-----------( 167      ( 08-24 @ 06:48 )             34.2                10.7   11.95 )-----------( 177      ( 08-24 @ 01:07 )             34.4                11.4   7.35  )-----------( 153      ( 08-23 @ 11:45 )             37.4         MEDICATIONS  (STANDING):  acetaminophen   Tablet .. 975 milliGRAM(s) Oral <User Schedule>  ceFAZolin   IVPB 2000 milliGRAM(s) IV Intermittent once  diphtheria/tetanus/pertussis (acellular) Vaccine (ADAcel) 0.5 milliLiter(s) IntraMuscular once  heparin   Injectable 5000 Unit(s) SubCutaneous every 12 hours  ibuprofen  Tablet. 600 milliGRAM(s) Oral every 6 hours  lactated ringers. 1000 milliLiter(s) (125 mL/Hr) IV Continuous <Continuous>  oxytocin Infusion 333.333 milliUNIT(s)/Min (1000 mL/Hr) IV Continuous <Continuous>  oxytocin Infusion 333.333 milliUNIT(s)/Min (1000 mL/Hr) IV Continuous <Continuous>    MEDICATIONS  (PRN):  diphenhydrAMINE 25 milliGRAM(s) Oral every 6 hours PRN Itching  HYDROmorphone  Injectable 0.5 milliGRAM(s) IV Push every 3 hours PRN Severe Pain (7 - 10)  lanolin Ointment 1 Application(s) Topical every 6 hours PRN Sore Nipples  magnesium hydroxide Suspension 30 milliLiter(s) Oral two times a day PRN Constipation  nalbuphine Injectable 2.5 milliGRAM(s) IV Push every 6 hours PRN Pruritus  naloxone Injectable 0.1 milliGRAM(s) IV Push every 3 minutes PRN For ANY of the following changes in patient status:  A. RR LESS THAN 10 breaths per minute, B. Oxygen saturation LESS THAN 90%, C. Sedation score of 6  ondansetron Injectable 4 milliGRAM(s) IV Push every 6 hours PRN Nausea  oxyCODONE    IR 5 milliGRAM(s) Oral every 3 hours PRN Moderate to Severe Pain (4-10)  oxyCODONE    IR 5 milliGRAM(s) Oral once PRN Moderate to Severe Pain (4-10)  oxyCODONE    IR 5 milliGRAM(s) Oral every 3 hours PRN Mild Pain (1 - 3)  oxyCODONE    IR 10 milliGRAM(s) Oral every 3 hours PRN Moderate Pain (4 - 6)  simethicone 80 milliGRAM(s) Chew every 4 hours PRN Gas

## 2021-03-02 NOTE — PATIENT PROFILE OB - SOURCE OF INFORMATION, OB PROFILE
patient Bactrim Counseling:  I discussed with the patient the risks of sulfa antibiotics including but not limited to GI upset, allergic reaction, drug rash, diarrhea, dizziness, photosensitivity, and yeast infections.  Rarely, more serious reactions can occur including but not limited to aplastic anemia, agranulocytosis, methemoglobinemia, blood dyscrasias, liver or kidney failure, lung infiltrates or desquamative/blistering drug rashes.

## 2021-03-07 ENCOUNTER — TRANSCRIPTION ENCOUNTER (OUTPATIENT)
Age: 36
End: 2021-03-07

## 2021-07-12 NOTE — DISCHARGE NOTE OB - TEMPERATURE GREATER THAN 100.0  F ORALLY
From: Poornima Child  To: John Severino  Sent: 7/12/2021 10:14 AM CDT  Subject: Other    I have attached my [facility] paperwork that needs to be completed by Dr Severino. Looks like they denied my claim for short term disability and the previous paperwork is no use. Now they need [facility] paperwork filled out for the procedure and any appts that I went to. Please and Thank You.  
Statement Selected

## 2021-10-18 NOTE — PATIENT PROFILE OB - SPIRITUAL CULTURAL, CURRENT SITUATION, PROFILE
Detail Level: Detailed
Introduction Text (Please End With A Colon): The following procedure was deferred: may consider biopsy if not improved
Instructions (Optional): Right anterior medial distal thigh- ED&C
Instructions (Optional): Left lateral knee- ED&C
none

## 2022-07-30 ENCOUNTER — NON-APPOINTMENT (OUTPATIENT)
Age: 37
End: 2022-07-30

## 2023-02-16 ENCOUNTER — ASOB RESULT (OUTPATIENT)
Age: 38
End: 2023-02-16

## 2023-02-16 ENCOUNTER — APPOINTMENT (OUTPATIENT)
Dept: ANTEPARTUM | Facility: CLINIC | Age: 38
End: 2023-02-16
Payer: COMMERCIAL

## 2023-02-16 PROCEDURE — 76801 OB US < 14 WKS SINGLE FETUS: CPT

## 2023-02-16 PROCEDURE — 76813 OB US NUCHAL MEAS 1 GEST: CPT

## 2023-04-17 ENCOUNTER — APPOINTMENT (OUTPATIENT)
Dept: ANTEPARTUM | Facility: CLINIC | Age: 38
End: 2023-04-17
Payer: COMMERCIAL

## 2023-04-17 ENCOUNTER — ASOB RESULT (OUTPATIENT)
Age: 38
End: 2023-04-17

## 2023-04-17 PROCEDURE — 76811 OB US DETAILED SNGL FETUS: CPT

## 2023-04-22 ENCOUNTER — NON-APPOINTMENT (OUTPATIENT)
Age: 38
End: 2023-04-22

## 2023-06-12 ENCOUNTER — ASOB RESULT (OUTPATIENT)
Age: 38
End: 2023-06-12

## 2023-06-12 ENCOUNTER — APPOINTMENT (OUTPATIENT)
Dept: ANTEPARTUM | Facility: CLINIC | Age: 38
End: 2023-06-12
Payer: COMMERCIAL

## 2023-06-12 PROCEDURE — 76816 OB US FOLLOW-UP PER FETUS: CPT

## 2023-08-04 ENCOUNTER — ASOB RESULT (OUTPATIENT)
Age: 38
End: 2023-08-04

## 2023-08-04 ENCOUNTER — APPOINTMENT (OUTPATIENT)
Dept: ANTEPARTUM | Facility: CLINIC | Age: 38
End: 2023-08-04
Payer: COMMERCIAL

## 2023-08-04 PROCEDURE — 76816 OB US FOLLOW-UP PER FETUS: CPT

## 2023-08-16 ENCOUNTER — OUTPATIENT (OUTPATIENT)
Dept: OUTPATIENT SERVICES | Facility: HOSPITAL | Age: 38
LOS: 1 days | End: 2023-08-16
Payer: COMMERCIAL

## 2023-08-16 VITALS
OXYGEN SATURATION: 98 % | WEIGHT: 171.08 LBS | HEIGHT: 60 IN | SYSTOLIC BLOOD PRESSURE: 114 MMHG | TEMPERATURE: 99 F | HEART RATE: 90 BPM | DIASTOLIC BLOOD PRESSURE: 79 MMHG | RESPIRATION RATE: 18 BRPM

## 2023-08-16 DIAGNOSIS — O34.219 MATERNAL CARE FOR UNSPECIFIED TYPE SCAR FROM PREVIOUS CESAREAN DELIVERY: ICD-10-CM

## 2023-08-16 DIAGNOSIS — Z98.891 HISTORY OF UTERINE SCAR FROM PREVIOUS SURGERY: Chronic | ICD-10-CM

## 2023-08-16 DIAGNOSIS — N60.09 SOLITARY CYST OF UNSPECIFIED BREAST: Chronic | ICD-10-CM

## 2023-08-16 DIAGNOSIS — Z01.818 ENCOUNTER FOR OTHER PREPROCEDURAL EXAMINATION: ICD-10-CM

## 2023-08-16 DIAGNOSIS — Z29.9 ENCOUNTER FOR PROPHYLACTIC MEASURES, UNSPECIFIED: ICD-10-CM

## 2023-08-16 LAB
ANION GAP SERPL CALC-SCNC: 14 MMOL/L — SIGNIFICANT CHANGE UP (ref 5–17)
BUN SERPL-MCNC: 14 MG/DL — SIGNIFICANT CHANGE UP (ref 7–23)
CALCIUM SERPL-MCNC: 8.7 MG/DL — SIGNIFICANT CHANGE UP (ref 8.4–10.5)
CHLORIDE SERPL-SCNC: 103 MMOL/L — SIGNIFICANT CHANGE UP (ref 96–108)
CO2 SERPL-SCNC: 19 MMOL/L — LOW (ref 22–31)
CREAT SERPL-MCNC: 0.49 MG/DL — LOW (ref 0.5–1.3)
EGFR: 124 ML/MIN/1.73M2 — SIGNIFICANT CHANGE UP
GLUCOSE SERPL-MCNC: 92 MG/DL — SIGNIFICANT CHANGE UP (ref 70–99)
HCT VFR BLD CALC: 30.2 % — LOW (ref 34.5–45)
HGB BLD-MCNC: 9.3 G/DL — LOW (ref 11.5–15.5)
MCHC RBC-ENTMCNC: 21 PG — LOW (ref 27–34)
MCHC RBC-ENTMCNC: 30.8 GM/DL — LOW (ref 32–36)
MCV RBC AUTO: 68.3 FL — LOW (ref 80–100)
NRBC # BLD: 0 /100 WBCS — SIGNIFICANT CHANGE UP (ref 0–0)
PLATELET # BLD AUTO: 244 K/UL — SIGNIFICANT CHANGE UP (ref 150–400)
POTASSIUM SERPL-MCNC: 4.1 MMOL/L — SIGNIFICANT CHANGE UP (ref 3.5–5.3)
POTASSIUM SERPL-SCNC: 4.1 MMOL/L — SIGNIFICANT CHANGE UP (ref 3.5–5.3)
RBC # BLD: 4.42 M/UL — SIGNIFICANT CHANGE UP (ref 3.8–5.2)
RBC # FLD: 14.7 % — HIGH (ref 10.3–14.5)
SODIUM SERPL-SCNC: 136 MMOL/L — SIGNIFICANT CHANGE UP (ref 135–145)
WBC # BLD: 6.57 K/UL — SIGNIFICANT CHANGE UP (ref 3.8–10.5)
WBC # FLD AUTO: 6.57 K/UL — SIGNIFICANT CHANGE UP (ref 3.8–10.5)

## 2023-08-16 PROCEDURE — 86900 BLOOD TYPING SEROLOGIC ABO: CPT

## 2023-08-16 PROCEDURE — G0463: CPT

## 2023-08-16 PROCEDURE — 86901 BLOOD TYPING SEROLOGIC RH(D): CPT

## 2023-08-16 PROCEDURE — 86850 RBC ANTIBODY SCREEN: CPT

## 2023-08-16 NOTE — OB PST NOTE - FALL HARM RISK - TYPE OF ASSESSMENT
Ambulated with pt in hallway to bathroom. Pt voided without difficulty, still unable to move bowels. Pt occasionally repeats herself, worried about grandchildren and their new foster home since her daughter recently passed away. Admission

## 2023-08-16 NOTE — OB PST NOTE - ASSESSMENT
Loose teeth or denture: denies    CAPRINI SCORE [CLOT]    AGE RELATED RISK FACTORS                                                       MOBILITY RELATED FACTORS  [ ] Age 41-60 years                                            (1 Point)                  [ ] Bed rest                                                        (1 Point)  [ ] Age: 61-74 years                                           (2 Points)                 [ ] Plaster cast                                                   (2 Points)  [ ] Age= 75 years                                              (3 Points)                 [ ] Bed bound for more than 72 hours                 (2 Points)    DISEASE RELATED RISK FACTORS                                               GENDER SPECIFIC FACTORS  [ ] Edema in the lower extremities                       (1 Point)                  [ ] Pregnancy                                                     (1 Point)  [ ] Varicose veins                                               (1 Point)                  [ ] Post-partum < 6 weeks                                   (1 Point)             [ ] BMI > 25 Kg/m2                                            (1 Point)                  [ ] Hormonal therapy  or oral contraception          (1 Point)                 [ ] Sepsis (in the previous month)                        (1 Point)                  [ ] History of pregnancy complications                 (1 point)  [ ] Pneumonia or serious lung disease                                               [ ] Unexplained or recurrent                     (1 Point)           (in the previous month)                               (1 Point)  [ ] Abnormal pulmonary function test                     (1 Point)                 SURGERY RELATED RISK FACTORS  [ ] Acute myocardial infarction                              (1 Point)                 [ ]  Section                                             (1 Point)  [ ] Congestive heart failure (in the previous month)  (1 Point)               [ ] Minor surgery                                                  (1 Point)   [ ] Inflammatory bowel disease                             (1 Point)                 [ ] Arthroscopic surgery                                        (2 Points)  [ ] Central venous access                                      (2 Points)                [ ] General surgery lasting more than 45 minutes   (2 Points)       [ ] Stroke (in the previous month)                          (5 Points)               [ ] Elective arthroplasty                                         (5 Points)                                                                                                                                               HEMATOLOGY RELATED FACTORS                                                 TRAUMA RELATED RISK FACTORS  [ ] Prior episodes of VTE                                     (3 Points)                [ ] Fracture of the hip, pelvis, or leg                       (5 Points)  [ ] Positive family history for VTE                         (3 Points)                 [ ] Acute spinal cord injury (in the previous month)  (5 Points)  [ ] Prothrombin 00607 A                                     (3 Points)                 [ ] Paralysis  (less than 1 month)                             (5 Points)  [ ] Factor V Leiden                                             (3 Points)                  [ ] Multiple Trauma within 1 month                        (5 Points)  [ ] Lupus anticoagulants                                     (3 Points)                                                           [ ] Anticardiolipin antibodies                               (3 Points)                                                       [ ] High homocysteine in the blood                      (3 Points)                                             [ ] Other congenital or acquired thrombophilia      (3 Points)                                                [ ] Heparin induced thrombocytopenia                  (3 Points)                                          Total Score [          ]    Caprini Score 0 - 2:  Low Risk, No VTE Prophylaxis required for most patients, encourage ambulation  Caprini Score 3 - 6:  At Risk, pharmacologic VTE prophylaxis is indicated for most patients (in the absence of a contraindication)  Caprini Score Greater than or = 7:  High Risk, pharmacologic VTE prophylaxis is indicated for most patients (in the absence of a contraindication)   Loose teeth or denture: denies    CAPRINI SCORE [CLOT]    AGE RELATED RISK FACTORS                                                       MOBILITY RELATED FACTORS  [ ] Age 41-60 years                                            (1 Point)                  [ ] Bed rest                                                        (1 Point)  [ ] Age: 61-74 years                                           (2 Points)                 [ ] Plaster cast                                                   (2 Points)  [ ] Age= 75 years                                              (3 Points)                 [ ] Bed bound for more than 72 hours                 (2 Points)    DISEASE RELATED RISK FACTORS                                               GENDER SPECIFIC FACTORS  [ ] Edema in the lower extremities                       (1 Point)                  [ X] Pregnancy                                                     (1 Point)  [ ] Varicose veins                                               (1 Point)                  [ ] Post-partum < 6 weeks                                   (1 Point)             [ X] BMI > 25 Kg/m2                                            (1 Point)                  [ ] Hormonal therapy  or oral contraception          (1 Point)                 [ ] Sepsis (in the previous month)                        (1 Point)                  [ ] History of pregnancy complications                 (1 point)  [ ] Pneumonia or serious lung disease                                               [ ] Unexplained or recurrent                     (1 Point)           (in the previous month)                               (1 Point)  [ ] Abnormal pulmonary function test                     (1 Point)                 SURGERY RELATED RISK FACTORS  [ ] Acute myocardial infarction                              (1 Point)                 [X ]  Section                                             (1 Point)  [ ] Congestive heart failure (in the previous month)  (1 Point)               [ ] Minor surgery                                                  (1 Point)   [ ] Inflammatory bowel disease                             (1 Point)                 [ ] Arthroscopic surgery                                        (2 Points)  [ ] Central venous access                                      (2 Points)                [ ] General surgery lasting more than 45 minutes   (2 Points)       [ ] Stroke (in the previous month)                          (5 Points)               [ ] Elective arthroplasty                                         (5 Points)                                                                                                                                               HEMATOLOGY RELATED FACTORS                                                 TRAUMA RELATED RISK FACTORS  [ ] Prior episodes of VTE                                     (3 Points)                [ ] Fracture of the hip, pelvis, or leg                       (5 Points)  [ ] Positive family history for VTE                         (3 Points)                 [ ] Acute spinal cord injury (in the previous month)  (5 Points)  [ ] Prothrombin 94639 A                                     (3 Points)                 [ ] Paralysis  (less than 1 month)                             (5 Points)  [ ] Factor V Leiden                                             (3 Points)                  [ ] Multiple Trauma within 1 month                        (5 Points)  [ ] Lupus anticoagulants                                     (3 Points)                                                           [ ] Anticardiolipin antibodies                               (3 Points)                                                       [ ] High homocysteine in the blood                      (3 Points)                                             [ ] Other congenital or acquired thrombophilia      (3 Points)                                                [ ] Heparin induced thrombocytopenia                  (3 Points)                                          Total Score [     3     ]    Caprini Score 0 - 2:  Low Risk, No VTE Prophylaxis required for most patients, encourage ambulation  Caprini Score 3 - 6:  At Risk, pharmacologic VTE prophylaxis is indicated for most patients (in the absence of a contraindication)  Caprini Score Greater than or = 7:  High Risk, pharmacologic VTE prophylaxis is indicated for most patients (in the absence of a contraindication)

## 2023-08-16 NOTE — OB PST NOTE - HISTORY OF PRESENT ILLNESS
38 yo female presents to PST  36 yo female presents to PST prior to scheduled  on 23 with Dr. Kayla Lynne. Pmhx includes  (2020, 2/3/2019, 2016). Reports good fetal movement. Denies chest pain, palpitations, sob/otto, dizziness, syncope, headaches, pedal edema. C/o of intermittent cough x 7 days - reports getting better. Denies fever.

## 2023-08-16 NOTE — OB PST NOTE - FALL HARM RISK - HARM RISK INTERVENTIONS

## 2023-08-22 ENCOUNTER — TRANSCRIPTION ENCOUNTER (OUTPATIENT)
Age: 38
End: 2023-08-22

## 2023-08-23 ENCOUNTER — INPATIENT (INPATIENT)
Facility: HOSPITAL | Age: 38
LOS: 1 days | Discharge: HOME | End: 2023-08-25
Attending: OBSTETRICS & GYNECOLOGY | Admitting: OBSTETRICS & GYNECOLOGY
Payer: MEDICAID

## 2023-08-23 VITALS — OXYGEN SATURATION: 100 % | HEART RATE: 70 BPM

## 2023-08-23 DIAGNOSIS — O26.899 OTHER SPECIFIED PREGNANCY RELATED CONDITIONS, UNSPECIFIED TRIMESTER: ICD-10-CM

## 2023-08-23 DIAGNOSIS — Z34.80 ENCOUNTER FOR SUPERVISION OF OTHER NORMAL PREGNANCY, UNSPECIFIED TRIMESTER: ICD-10-CM

## 2023-08-23 DIAGNOSIS — N60.09 SOLITARY CYST OF UNSPECIFIED BREAST: Chronic | ICD-10-CM

## 2023-08-23 DIAGNOSIS — Z98.891 HISTORY OF UTERINE SCAR FROM PREVIOUS SURGERY: Chronic | ICD-10-CM

## 2023-08-23 LAB
COVID-19 SPIKE DOMAIN AB INTERP: POSITIVE
COVID-19 SPIKE DOMAIN ANTIBODY RESULT: >250 U/ML — HIGH
HCT VFR BLD CALC: 29.4 % — LOW (ref 34.5–45)
HGB BLD-MCNC: 9.2 G/DL — LOW (ref 11.5–15.5)
MCHC RBC-ENTMCNC: 21.2 PG — LOW (ref 27–34)
MCHC RBC-ENTMCNC: 31.3 GM/DL — LOW (ref 32–36)
MCV RBC AUTO: 67.7 FL — LOW (ref 80–100)
NRBC # BLD: 0 /100 WBCS — SIGNIFICANT CHANGE UP (ref 0–0)
PLATELET # BLD AUTO: 243 K/UL — SIGNIFICANT CHANGE UP (ref 150–400)
RBC # BLD: 4.34 M/UL — SIGNIFICANT CHANGE UP (ref 3.8–5.2)
RBC # FLD: 14.8 % — HIGH (ref 10.3–14.5)
SARS-COV-2 IGG+IGM SERPL QL IA: >250 U/ML — HIGH
SARS-COV-2 IGG+IGM SERPL QL IA: POSITIVE
WBC # BLD: 8.53 K/UL — SIGNIFICANT CHANGE UP (ref 3.8–10.5)
WBC # FLD AUTO: 8.53 K/UL — SIGNIFICANT CHANGE UP (ref 3.8–10.5)

## 2023-08-23 PROCEDURE — 59514 CESAREAN DELIVERY ONLY: CPT | Mod: AS,U9

## 2023-08-23 RX ORDER — ONDANSETRON 8 MG/1
4 TABLET, FILM COATED ORAL EVERY 6 HOURS
Refills: 0 | Status: DISCONTINUED | OUTPATIENT
Start: 2023-08-23 | End: 2023-08-24

## 2023-08-23 RX ORDER — ACETAMINOPHEN 500 MG
975 TABLET ORAL
Refills: 0 | Status: DISCONTINUED | OUTPATIENT
Start: 2023-08-23 | End: 2023-08-25

## 2023-08-23 RX ORDER — KETOROLAC TROMETHAMINE 30 MG/ML
30 SYRINGE (ML) INJECTION EVERY 6 HOURS
Refills: 0 | Status: COMPLETED | OUTPATIENT
Start: 2023-08-23 | End: 2023-08-24

## 2023-08-23 RX ORDER — LANOLIN
1 OINTMENT (GRAM) TOPICAL EVERY 6 HOURS
Refills: 0 | Status: DISCONTINUED | OUTPATIENT
Start: 2023-08-23 | End: 2023-08-25

## 2023-08-23 RX ORDER — MAGNESIUM HYDROXIDE 400 MG/1
30 TABLET, CHEWABLE ORAL
Refills: 0 | Status: DISCONTINUED | OUTPATIENT
Start: 2023-08-23 | End: 2023-08-25

## 2023-08-23 RX ORDER — OXYCODONE HYDROCHLORIDE 5 MG/1
10 TABLET ORAL
Refills: 0 | Status: DISCONTINUED | OUTPATIENT
Start: 2023-08-23 | End: 2023-08-24

## 2023-08-23 RX ORDER — HEPARIN SODIUM 5000 [USP'U]/ML
5000 INJECTION INTRAVENOUS; SUBCUTANEOUS EVERY 12 HOURS
Refills: 0 | Status: DISCONTINUED | OUTPATIENT
Start: 2023-08-23 | End: 2023-08-25

## 2023-08-23 RX ORDER — SODIUM CHLORIDE 9 MG/ML
1000 INJECTION, SOLUTION INTRAVENOUS
Refills: 0 | Status: DISCONTINUED | OUTPATIENT
Start: 2023-08-23 | End: 2023-08-25

## 2023-08-23 RX ORDER — CITRIC ACID/SODIUM CITRATE 300-500 MG
30 SOLUTION, ORAL ORAL ONCE
Refills: 0 | Status: COMPLETED | OUTPATIENT
Start: 2023-08-23 | End: 2023-08-23

## 2023-08-23 RX ORDER — BUTORPHANOL TARTRATE 2 MG/ML
0.12 INJECTION, SOLUTION INTRAMUSCULAR; INTRAVENOUS EVERY 6 HOURS
Refills: 0 | Status: DISCONTINUED | OUTPATIENT
Start: 2023-08-23 | End: 2023-08-24

## 2023-08-23 RX ORDER — NALBUPHINE HYDROCHLORIDE 10 MG/ML
2.5 INJECTION, SOLUTION INTRAMUSCULAR; INTRAVENOUS; SUBCUTANEOUS EVERY 6 HOURS
Refills: 0 | Status: DISCONTINUED | OUTPATIENT
Start: 2023-08-23 | End: 2023-08-24

## 2023-08-23 RX ORDER — SODIUM CHLORIDE 9 MG/ML
1000 INJECTION, SOLUTION INTRAVENOUS
Refills: 0 | Status: DISCONTINUED | OUTPATIENT
Start: 2023-08-23 | End: 2023-08-23

## 2023-08-23 RX ORDER — FAMOTIDINE 10 MG/ML
20 INJECTION INTRAVENOUS ONCE
Refills: 0 | Status: COMPLETED | OUTPATIENT
Start: 2023-08-23 | End: 2023-08-23

## 2023-08-23 RX ORDER — SIMETHICONE 80 MG/1
80 TABLET, CHEWABLE ORAL EVERY 4 HOURS
Refills: 0 | Status: DISCONTINUED | OUTPATIENT
Start: 2023-08-23 | End: 2023-08-25

## 2023-08-23 RX ORDER — NALOXONE HYDROCHLORIDE 4 MG/.1ML
0.1 SPRAY NASAL
Refills: 0 | Status: DISCONTINUED | OUTPATIENT
Start: 2023-08-23 | End: 2023-08-24

## 2023-08-23 RX ORDER — OXYCODONE HYDROCHLORIDE 5 MG/1
5 TABLET ORAL ONCE
Refills: 0 | Status: DISCONTINUED | OUTPATIENT
Start: 2023-08-23 | End: 2023-08-25

## 2023-08-23 RX ORDER — MORPHINE SULFATE 50 MG/1
0.1 CAPSULE, EXTENDED RELEASE ORAL ONCE
Refills: 0 | Status: DISCONTINUED | OUTPATIENT
Start: 2023-08-23 | End: 2023-08-24

## 2023-08-23 RX ORDER — OXYTOCIN 10 UNIT/ML
333.33 VIAL (ML) INJECTION
Qty: 20 | Refills: 0 | Status: DISCONTINUED | OUTPATIENT
Start: 2023-08-23 | End: 2023-08-25

## 2023-08-23 RX ORDER — TETANUS TOXOID, REDUCED DIPHTHERIA TOXOID AND ACELLULAR PERTUSSIS VACCINE, ADSORBED 5; 2.5; 8; 8; 2.5 [IU]/.5ML; [IU]/.5ML; UG/.5ML; UG/.5ML; UG/.5ML
0.5 SUSPENSION INTRAMUSCULAR ONCE
Refills: 0 | Status: DISCONTINUED | OUTPATIENT
Start: 2023-08-23 | End: 2023-08-25

## 2023-08-23 RX ORDER — OXYCODONE HYDROCHLORIDE 5 MG/1
5 TABLET ORAL
Refills: 0 | Status: DISCONTINUED | OUTPATIENT
Start: 2023-08-23 | End: 2023-08-25

## 2023-08-23 RX ORDER — DIPHENHYDRAMINE HCL 50 MG
25 CAPSULE ORAL EVERY 6 HOURS
Refills: 0 | Status: DISCONTINUED | OUTPATIENT
Start: 2023-08-23 | End: 2023-08-25

## 2023-08-23 RX ORDER — DEXAMETHASONE 0.5 MG/5ML
4 ELIXIR ORAL EVERY 6 HOURS
Refills: 0 | Status: DISCONTINUED | OUTPATIENT
Start: 2023-08-23 | End: 2023-08-24

## 2023-08-23 RX ORDER — SODIUM CHLORIDE 9 MG/ML
1000 INJECTION, SOLUTION INTRAVENOUS ONCE
Refills: 0 | Status: DISCONTINUED | OUTPATIENT
Start: 2023-08-23 | End: 2023-08-23

## 2023-08-23 RX ORDER — IBUPROFEN 200 MG
600 TABLET ORAL EVERY 6 HOURS
Refills: 0 | Status: COMPLETED | OUTPATIENT
Start: 2023-08-23 | End: 2024-07-21

## 2023-08-23 RX ORDER — OXYCODONE HYDROCHLORIDE 5 MG/1
5 TABLET ORAL
Refills: 0 | Status: DISCONTINUED | OUTPATIENT
Start: 2023-08-23 | End: 2023-08-24

## 2023-08-23 RX ORDER — DIPHENHYDRAMINE HCL 50 MG
25 CAPSULE ORAL EVERY 4 HOURS
Refills: 0 | Status: DISCONTINUED | OUTPATIENT
Start: 2023-08-23 | End: 2023-08-24

## 2023-08-23 RX ADMIN — Medication 975 MILLIGRAM(S): at 20:59

## 2023-08-23 RX ADMIN — Medication 30 MILLILITER(S): at 16:43

## 2023-08-23 RX ADMIN — SODIUM CHLORIDE 125 MILLILITER(S): 9 INJECTION, SOLUTION INTRAVENOUS at 15:43

## 2023-08-23 RX ADMIN — FAMOTIDINE 20 MILLIGRAM(S): 10 INJECTION INTRAVENOUS at 16:43

## 2023-08-23 NOTE — OB PROVIDER H&P - NSHPPHYSICALEXAM_GEN_ALL_CORE
T(C): 36.9 (08-23-23 @ 14:57), Max: 36.9 (08-23-23 @ 10:54)  HR: 64 (08-23-23 @ 16:02) (60 - 77)  BP: 141/66 (08-23-23 @ 14:57) (114/62 - 141/66)  RR: 18 (08-23-23 @ 14:57) (18 - 18)  SpO2: 99% (08-23-23 @ 16:02) (90% - 100%)  GEN:NAD  CV:RRR  Pulm: CTA bl  Abd soft NT gravid  FHT:  130   , mod patricia, + accels, - decels   TOCO:  q8-10m  VE: 1/long/-3/soft

## 2023-08-23 NOTE — OB PROVIDER TRIAGE NOTE - HISTORY OF PRESENT ILLNESS
36yo  EDC 23 @ 38w6d, h/o 3 prior c/s, c/o intermittent lower abdominal pain x 2 days. Denies LOF/VB. +FM. Repeat C/S scheduled .  GBS negative   EFW 3500  PNC uncomplicated  PNL:    OB: 2016 pltcs arrest at 6cm 9#7        2019 rltcs, failed TOLAC, cat 2, 8#        2020 rltcs 8#  GYN:Denies fibroids/ovarian cysts/STI's/abnl pap  PMH: none  PSH: c/s x3  MEDS: none  ALL:NKDA  Psych: Denies anxiety/depression  Accepts blood.

## 2023-08-23 NOTE — OB PROVIDER H&P - ATTENDING COMMENTS
patient seen and examined.    Has been tai while in observation.   states that they are painful.    Fetal heart rate tracing is category 1   Contractions every 3-12 minutes and irregular but painful to patient.    Vaginal exam:  /-3   patient in early labor.    History of 3 prior  sections.    Patient has been NPO since last night.    Will proceed with  delivery.    Risks benefits and alternatives have been discussed with the patient.    Patient agrees and accepts.    PLEE

## 2023-08-23 NOTE — OB RN PATIENT PROFILE - NSSTATEDREASONFORADM_OBGYN_A_OB_FT
contractions x 12 noon 8/22/23  denies leaking fluid  denies vagianal bleeding  + FM   Previous c section x 3

## 2023-08-23 NOTE — OB PROVIDER H&P - ASSESSMENT
36yo  EDC 23 @ 38w6d for  rltcs in early labor, GBS negative, category 1 FHT  admit  efm/toco  ivf  routine labs  preop for ltcs  anesthesia consult  Dr Maged BUSH

## 2023-08-23 NOTE — OB RN DELIVERY SUMMARY - NS_SEPSISRSKCALC_OBGYN_ALL_OB_FT
EOS calculated successfully. EOS Risk Factor: 0.5/1000 live births ([facility] national incidence); GA=38w6d; Temp=98.42; ROM=0.017; GBS='Negative'; Antibiotics='No antibiotics or any antibiotics < 2 hrs prior to birth'

## 2023-08-23 NOTE — OB RN TRIAGE NOTE - CHIEF COMPLAINT QUOTE
Lower abd cramping since about 12 noon 8/22/23.  Denies loss of fluid  denies vagianal bleeding  + Fetal movment.  Previous c/ section x 3

## 2023-08-23 NOTE — OB PROVIDER H&P - NSLOWPPHRISK_OBGYN_A_OB
Zamudio Pregnancy/Less than or equal to 4 previous vaginal births/No known bleeding disorder/No history of postpartum hemorrhage

## 2023-08-23 NOTE — OB RN DELIVERY SUMMARY - NSSELHIDDEN_OBGYN_ALL_OB_FT
1 [NS_DeliveryAttending1_OBGYN_ALL_OB_FT:[id number]==],[NS_DeliveryAssist1_OBGYN_ALL_OB_FT:[id number]==],[NS_DeliveryRN_OBGYN_ALL_OB_FT:[id number]=]

## 2023-08-23 NOTE — OB RN INTRAOPERATIVE NOTE - NS_CAUTERYPENNUMBER_OBGYN_ALL_OB_FT
Current Outpatient Prescriptions   Medication Sig   • traMADol (ULTRAM) 50 MG tablet Take 2 tablets by mouth 2 times daily.   • gabapentin (NEURONTIN) 300 MG capsule TAKE 3 CAPSULES BY MOUTH 2 TIMES DAILY. MAY TAKE 1 EXTRA CAPSULE IF NEEDED   • LANTUS 100 UNIT/ML injectable solution INJECT 20 UNITS INTO THE SKIN NIGHTLY.   • NOVOLOG FLEXPEN 100 UNIT/ML pen-injector UP TO 90 UNITS DAILY AS NEEDED FOR SLIDING SCALE COVERAGE AS DIRECTED   • FREESTYLE LITE test strip TEST BLOOD SUGAR 6 TO 8 TIMES DAILY AS DIRECTED.   • mupirocin (BACTROBAN) 2 % ointment PLEASE APPLY TO AFFECTED OPEN AREAS ON BUTTOCKS TO PREVENT SUPERINFECTION   • BD ULTRA-FINE PEN NEEDLES 29G X 12.7MM Misc FOR INJECTION UP TO 5 X DAILY   • BIOTIN 5000 PO Take by mouth 1 time.   • Lancets (FREESTYLE) Misc      No current facility-administered medications for this visit.        
Patient is asking for a refill of: FREESTYLE LITE test strip  Last Rx 6/27/17 qty 800 with 0 refills Last seen 2/26/18 last labs 2/6/18 Next Appt N/A  Please sign if ok to refill     
[id number]

## 2023-08-23 NOTE — OB PROVIDER DELIVERY SUMMARY - NSSELHIDDEN_OBGYN_ALL_OB_FT
[NS_DeliveryAttending1_OBGYN_ALL_OB_FT:[id number]==],[NS_DeliveryAssist1_OBGYN_ALL_OB_FT:[id number]==],[NS_DeliveryRN_OBGYN_ALL_OB_FT:[id number]]

## 2023-08-23 NOTE — OB RN INTRAOPERATIVE NOTE - NSSELHIDDEN_OBGYN_ALL_OB_FT
[NS_DeliveryAttending1_OBGYN_ALL_OB_FT:[id number]==],[NS_DeliveryAssist1_OBGYN_ALL_OB_FT:[id number]==],[NS_DeliveryRN_OBGYN_ALL_OB_FT:[id number]=]

## 2023-08-23 NOTE — OB PROVIDER TRIAGE NOTE - NSHPPHYSICALEXAM_GEN_ALL_CORE
T(C): 36.9 (08-23-23 @ 10:59), Max: 36.9 (08-23-23 @ 10:54)  HR: 67 (08-23-23 @ 11:48) (60 - 75)  BP: 114/62 (08-23-23 @ 10:59) (114/62 - 114/62)  RR: 18 (08-23-23 @ 10:59) (18 - 18)  SpO2: 100% (08-23-23 @ 11:48) (90% - 100%)  GEN:NAD  CV:RRR  Pulm: CTA bl  Abd soft NT gravid  FHT:   130  , mod patricia, + accels, - decels   TOCO:  q4-11m  VE: 1/0/-3

## 2023-08-23 NOTE — OB PROVIDER DELIVERY SUMMARY - NSPROVIDERDELIVERYNOTE_OBGYN_ALL_OB_FT
rLTCS @ 38 6/7wks in early labor  Viable female infant, Apgars 9/9, weight 3700g  Hysterotomy closed in 1 layer using PDS  Grossly normal uterus, tubes, and ovaries  Abdomen closed in standard fashion  Pt and infant to recovery in stable condition  QBL:     IVF:      UOP:  Dictation#: rLTCS @ 38 6/7wks in early labor  Viable female infant, Apgars 9/9, weight 3700g  Hysterotomy closed in 1 layer using PDS  Grossly normal uterus, tubes, and ovaries  Abdomen closed in standard fashion  Pt and infant to recovery in stable condition  QBL:   676 ml  IVF:    2L  UOP: 200 ml clear at end of the procedure  Dictation#: rLTCS @ 38 6/7wks in early labor  Viable female infant, Apgars 9/9, weight 3700g  Hysterotomy closed in 1 layer using PDS  Grossly normal uterus, tubes, and ovaries  Abdomen closed in standard fashion  Pt and infant to recovery in stable condition  QBL:   676 ml  IVF:    2L  UOP: 200 ml clear at end of the procedure  Dictation#: [id number]

## 2023-08-23 NOTE — OB PROVIDER TRIAGE NOTE - NSOBPROVIDERNOTE_OBGYN_ALL_OB_FT
AP 36yo  @ 38w6d, h/o 3 prior c/s, r/o labor, category 1 FHT  -EFM/TOCO  -IV fluids  -CBC, T&S  -Re-examine in 3 hours  DW Dr Maged Hidalgo PAC AP 36yo  @ 38w6d, h/o 3 prior c/s, r/o labor, category 1 FHT  -EFM/TOCO  -IV fluids  -CBC, T&S  -Re-examine in 3 hours  DW Dr Maged Hidalgo PAC    Addendum  Pt c/o intermittent ctx  T(C): 36.9 (23 @ 10:59), Max: 36.9 (23 @ 10:54)  HR: 69 (23 @ 14:37) (60 - 77)  BP: 114/62 (23 @ 10:59) (114/62 - 114/62)  RR: 18 (23 @ 10:59) (18 - 18)  SpO2: 99% (23 @ 14:37) (90% - 100%)  /mod/+accels/no decels/discontinuous at times  TOCO q9-10m  VE 1/0/-3  AP 37  @ 38w6d , h/o prior c/s x3, with intermittent ctx, not in labor, VE unchanged, reassuring tracing  -for likely discharge home  DW Dr Maged Hidalgo PAC

## 2023-08-23 NOTE — OB RN PATIENT PROFILE - PATIENT REPRESENTATIVE PHONE
Subjective  Patient seen and examined.  Sleeping has cervical collar in place easily arousable offers no complaints. Felt dizzy when gotup with therapy. Orthostatic vitals positive . bp meds held. Started on iv fluids . Accepted to rehab, awaiting bed / insurance auth    Review of Systems   Constitutional: Negative for chills and fever.   HENT: Negative for hearing loss.    Eyes: Negative for visual disturbance.   Respiratory: Negative for cough, chest tightness and shortness of breath.    Cardiovascular: Negative for chest pain, palpitations and leg swelling.   Gastrointestinal: Negative for abdominal pain and diarrhea.   Endocrine: Negative for polyuria.   Genitourinary: Negative for dysuria and flank pain.   Musculoskeletal: Negative for myalgias.   Skin: Negative for rash.   Neurological: Positive for dizziness. Negative for weakness and headaches.   Psychiatric/Behavioral: Negative for agitation.       Objective    Last Recorded Vitals  Blood pressure 117/77, pulse 97, temperature 98.2 °F (36.8 °C), temperature source Oral, resp. rate 16, height 5' 8\" (1.727 m), weight 82 kg (180 lb 12.4 oz), SpO2 96 %.  Body mass index is 27.49 kg/m².    Physical Exam  Vitals reviewed.   Constitutional:       General: He is not in acute distress.     Appearance: Normal appearance.   HENT:      Head: Normocephalic.      Comments: Cervical collar in place     Mouth/Throat:      Mouth: Mucous membranes are moist.   Eyes:      Conjunctiva/sclera: Conjunctivae normal.   Cardiovascular:      Rate and Rhythm: Normal rate and regular rhythm.      Heart sounds: Normal heart sounds. No murmur heard.     Pulmonary:      Effort: No respiratory distress.      Breath sounds: No wheezing.   Abdominal:      General: There is no distension.      Tenderness: There is no abdominal tenderness. There is no guarding or rebound.   Musculoskeletal:         General: No swelling.      Cervical back: Neck supple.      Left lower leg: No edema.   Skin:      Findings: No bruising or rash.   Neurological:      General: No focal deficit present.      Mental Status: He is alert.   Psychiatric:         Mood and Affect: Mood normal.         Behavior: Behavior normal.              Intake/Output Summary (Last 24 hours) at 9/10/2021 1419  Last data filed at 9/10/2021 0500  Gross per 24 hour   Intake 360 ml   Output 720 ml   Net -360 ml        Labs   Recent Results (from the past 24 hour(s))   Basic Metabolic Panel    Collection Time: 09/10/21  5:46 AM   Result Value Ref Range    Fasting Status      Sodium 135 135 - 145 mmol/L    Potassium 3.6 3.4 - 5.1 mmol/L    Chloride 101 98 - 107 mmol/L    Carbon Dioxide 30 21 - 32 mmol/L    Anion Gap 8 (L) 10 - 20 mmol/L    Glucose 112 (H) 65 - 99 mg/dL    BUN 10 6 - 20 mg/dL    Creatinine 0.89 0.67 - 1.17 mg/dL    Glomerular Filtration Rate >90 >=60    BUN/ Creatinine Ratio 11 7 - 25    Calcium 8.7 8.4 - 10.2 mg/dL   CBC with Automated Differential (performable only)    Collection Time: 09/10/21  5:46 AM   Result Value Ref Range    WBC 12.9 (H) 4.2 - 11.0 K/mcL    RBC 5.05 4.50 - 5.90 mil/mcL    HGB 15.5 13.0 - 17.0 g/dL    HCT 47.2 39.0 - 51.0 %    MCV 93.5 78.0 - 100.0 fl    MCH 30.7 26.0 - 34.0 pg    MCHC 32.8 32.0 - 36.5 g/dL    RDW-CV 12.7 11.0 - 15.0 %    RDW-SD 43.5 39.0 - 50.0 fL     140 - 450 K/mcL    NRBC 0 <=0 /100 WBC    Neutrophil, Percent 79 %    Lymphocytes, Percent 10 %    Mono, Percent 11 %    Eosinophils, Percent 0 %    Basophils, Percent 0 %    Immature Granulocytes 0 %    Absolute Neutrophils 10.0 (H) 1.8 - 7.7 K/mcL    Absolute Lymphocytes 1.3 1.0 - 4.0 K/mcL    Absolute Monocytes 1.5 (H) 0.3 - 0.9 K/mcL    Absolute Eosinophils  0.0 0.0 - 0.5 K/mcL    Absolute Basophils 0.0 0.0 - 0.3 K/mcL    Absolute Immmature Granulocytes 0.1 0.0 - 0.2 K/mcL        Imaging  No results found.    CT CERVICAL SPINE WO CONTRAST   Final Result      No acute fracture or malalignment.Evidence of C3, C4, C5 and C6   decompressive  laminectomy with bilateral posterior fixation noted.  A   posterior surgical drain is also noted.  Soft tissue and intraspinal gas is   seen.      Mild central stenosis at C2/C3.  Bilateral foraminal stenosis at C3/C4,   C4/C5, C5/C6 levels.  Moderate to severe central and bilateral foraminal   stenosis at C6-C7 also noted.      Electronically Signed by: TK SPENCER MD    Signed on: 9/9/2021 6:29 AM          FL GUIDANCE WITHOUT REPORT   Final Result      CT CERVICAL SPINE WO CONTRAST   Final Result       No acute osseous abnormality.      Multilevel degenerative changes of the cervical spine, superimposed on a   congenitally narrow canal, are similar to prior.      Electronically Signed by: AVILA CHERRY M.D.    Signed on: 9/5/2021 12:41 PM          MRI CERVICAL SPINE WO CONTRAST   Final Result       Motion degraded study.      Multilevel degenerative changes of the cervical spine, superimposed on a   congenitally narrow canal.  There is severe spinal canal stenosis at C3-C4   and C6-C7.  There is moderate spinal canal stenosis at C4-C5 and C5-C6.      Multilevel neural foraminal narrowing, including severe bilateral neural   foraminal stenosis from C3-C4 through C6-C7.        Multifocal cord signal abnormality and volume loss is consistent with   myelomalacia.      Partially visualized chronic bilateral inferior cerebellar infarcts.      Electronically Signed by: AVILA CHERRY M.D.    Signed on: 9/4/2021 4:48 PM          MRI LUMBAR SPINE W WO CONTRAST   Final Result   No clear evidence of canal stenosis, or cord compression.   Normal signal size and configuration of the spinal cord without evidence of   abnormal enhancement. Discogenic degenerative changes throughout the   thoracic spine along with facet arthropathy. Endplate edema and enhancement   at T6-T9 which is most likely advanced discogenic degenerative changes with   discitis osteomyelitis less likely.       HISTORY:   Cord compression 178.57 lb       TECHNIQUE: MRI lumbar spine protocol with 7.5  cc of IV Gadavist.      COMPARISONS: January 12, 2018 CT      FINDINGS:   There is normal alignment .   Vertebral bodies heights are normal.   Marrow signal is normal.     The intervertebral discs demonstrate mild excavation and narrowing L2-L3,   L5-S1. Conus medullaris and cauda equina are normal.   The spinal canal is grossly patent.      L1-L2: Normal Canal and Neuroforamen.   L2-L3: Mild disc bulge no canal or foraminal stenosis. Facet arthropathy.   L3-L4: Mild disc bulge no canal or foraminal stenosis. Facet arthropathy..   L4-L5: Large concentric disc bulge ligamentum flavum thickening and severe   facet arthropathy. Preservation of central canal and relative preservation   of the neuroforamen bilaterally.   L5-S1: Concentric disc bulge severe ligamentum flavum thickening and facet   arthropathy. Preservation of the central canal but severe foraminal   stenoses bilaterally.       Postcontrast images are normal. Interspinous ligament edema and enhancement   throughout the lumbar spine. Severe perifacet edema and enhancement with   synovial cyst demonstrated posteriorly at the levels of L4-L5 and L5-S1.   These are evident on images 2 through 15 series 11 and milder on series 5.   The paraspinal soft tissues are grossly unremarkable.       IMPRESSION:   No clear evidence of canal stenosis. Foraminal stenosis at the level of   L5-S1 as described. Severe multilevel degenerative facet arthropathy. Most   significant at L4-S1. Please see above.      Electronically Signed by: ILEANA HEADLEY MD    Signed on: 9/4/2021 12:34 AM          MRI THORACIC SPINE W WO CONTRAST   Final Result   No clear evidence of canal stenosis, or cord compression.   Normal signal size and configuration of the spinal cord without evidence of   abnormal enhancement. Discogenic degenerative changes throughout the   thoracic spine along with facet arthropathy. Endplate edema and enhancement   at  T6-T9 which is most likely advanced discogenic degenerative changes with   discitis osteomyelitis less likely.       HISTORY:   Cord compression 178.57 lb      TECHNIQUE: MRI lumbar spine protocol with 7.5  cc of IV Gadavist.      COMPARISONS: January 12, 2018 CT      FINDINGS:   There is normal alignment .   Vertebral bodies heights are normal.   Marrow signal is normal.     The intervertebral discs demonstrate mild excavation and narrowing L2-L3,   L5-S1. Conus medullaris and cauda equina are normal.   The spinal canal is grossly patent.      L1-L2: Normal Canal and Neuroforamen.   L2-L3: Mild disc bulge no canal or foraminal stenosis. Facet arthropathy.   L3-L4: Mild disc bulge no canal or foraminal stenosis. Facet arthropathy..   L4-L5: Large concentric disc bulge ligamentum flavum thickening and severe   facet arthropathy. Preservation of central canal and relative preservation   of the neuroforamen bilaterally.   L5-S1: Concentric disc bulge severe ligamentum flavum thickening and facet   arthropathy. Preservation of the central canal but severe foraminal   stenoses bilaterally.       Postcontrast images are normal. Interspinous ligament edema and enhancement   throughout the lumbar spine. Severe perifacet edema and enhancement with   synovial cyst demonstrated posteriorly at the levels of L4-L5 and L5-S1.   These are evident on images 2 through 15 series 11 and milder on series 5.   The paraspinal soft tissues are grossly unremarkable.       IMPRESSION:   No clear evidence of canal stenosis. Foraminal stenosis at the level of   L5-S1 as described. Severe multilevel degenerative facet arthropathy. Most   significant at L4-S1. Please see above.      Electronically Signed by: ILEANA HEADLEY MD    Signed on: 9/4/2021 12:34 AM          CT HEAD WO CONTRAST   Final Result   No acute intracranial hemorrhage mass effect or shift. Extensive   encephalomalacia as described.      Electronically Signed by: ILEANA HEADLEY  MD    Signed on: 9/3/2021 10:00 PM          XR CHEST PA AND LATERAL 2 VIEWS   Final Result    No evidence of acute cardiopulmonary process.      Electronically Signed by: JACQUELYN ZEPEDA M.D.    Signed on: 9/3/2021 4:19 PM          XR CERVICAL SPINE 1 VIEW    (Results Pending)       LAST ECHO/ECHO STRESS:  No valid procedures specified.  Results for orders placed or performed during the hospital encounter of 09/03/21   Electrocardiogram 12-Lead   Result Value Ref Range    Ventricular Rate EKG/Min (BPM) 92     Atrial Rate (BPM) 92     MN-Interval (MSEC) 196     QRS-Interval (MSEC) 86     QT-Interval (MSEC) 346     QTc 428     P Axis (Degrees) 45     R Axis (Degrees) 23     T Axis (Degrees) 39     REPORT TEXT       Sinus rhythm  with occasional  premature ventricular complexes  and  premature atrial complexes  Nonspecific T wave abnormality  Abnormal ECG  When compared with ECG of  03-SEP-2021 16:02,  premature ventricular complexes  are now  present  premature atrial complexes  are now  present  Confirmed by BALBINA CAMARILLO MD ([id number]) on 9/10/2021 7:23:36 AM      \"}      Assessment and Plan    72 yo M with PMH of HTN, schizophrenia, aneurysm of thoracic aorta, moon's esophagus, developmental delay, enlarged prostate without lower urinary tract symptoms and hearing loss who lives in a group home presented to the ED with complaints of leg weakness and back pain    Ataxia   Spasticity   Severe cervical canal stenosis      CT head negative  MRI of the thoracic and lumbar spine did not show any clear evidence of canal stenosis or cord compression, degenerative changes noted throughout the thoracic spine and severe foraminal stenosis.   MRI of the cervical spine showed severe spinal canal stenosis at C3-C4  and C6-C7  Neurology and neurosurgery following   Plan for decompression tentatively scheduled for Wednesday  Cards consulted for pre op clearance, no prior cardiac history other than aortic aneurysm for which  he follows cardiology as OP  Appreciate cards recs   09/08:   C3-6 posterior decompressive laminectomies and foraminotomies                        2.) C3-6 lateral mass fixation with screws and rods                         3.) bilateral C3-4, C4-5, and C5-6 posterolateral arthrodesis using local autograft mixed with allograft.  Likely acute rehab per spine surgery cervical collar in place continue the pain medications  09/10; continue therapy , await acceptance to rehab      Low back pain on the L side:   MRI shows degenerative changes in the lumbar spine  Physical therapy      Diarrhea: now resolved      Urinary frequency/ burning: UA negative for infection  Could be related to his prostate enlargement   Started on tamsulosin  Monitor      Hypokalemia: replaced  Monitor and replete PRN     HTN: PTA meds resumed- nifedipine, bisoprolol  Bp stable   Monitor      Depression: Lexapro and trazodone      Moseley's esophagus: protonix      Aneurysm of the thoracic aorta: stable  Monitor      Mild intellectual disability     Dizziness and mild orthostatic hypotension , dark urine ; iv fluids, hold bp meds , check ua c/s     Misc:     Diet:  cardiac  Code: full    D/w jr machado     DVT Prophylaxis      Disposition:         5`6 [phone]

## 2023-08-24 ENCOUNTER — TRANSCRIPTION ENCOUNTER (OUTPATIENT)
Age: 38
End: 2023-08-24

## 2023-08-24 DIAGNOSIS — D62 ACUTE POSTHEMORRHAGIC ANEMIA: ICD-10-CM

## 2023-08-24 LAB
ANISOCYTOSIS BLD QL: SLIGHT — SIGNIFICANT CHANGE UP
BASOPHILS # BLD AUTO: 0.09 K/UL — SIGNIFICANT CHANGE UP (ref 0–0.2)
BASOPHILS NFR BLD AUTO: 0.9 % — SIGNIFICANT CHANGE UP (ref 0–2)
DACRYOCYTES BLD QL SMEAR: SLIGHT — SIGNIFICANT CHANGE UP
EOSINOPHIL # BLD AUTO: 0 K/UL — SIGNIFICANT CHANGE UP (ref 0–0.5)
EOSINOPHIL NFR BLD AUTO: 0 % — SIGNIFICANT CHANGE UP (ref 0–6)
GIANT PLATELETS BLD QL SMEAR: PRESENT — SIGNIFICANT CHANGE UP
HCT VFR BLD CALC: 23.7 % — LOW (ref 34.5–45)
HGB BLD-MCNC: 7.3 G/DL — LOW (ref 11.5–15.5)
LYMPHOCYTES # BLD AUTO: 1.1 K/UL — SIGNIFICANT CHANGE UP (ref 1–3.3)
LYMPHOCYTES # BLD AUTO: 10.6 % — LOW (ref 13–44)
MANUAL SMEAR VERIFICATION: SIGNIFICANT CHANGE UP
MCHC RBC-ENTMCNC: 21.3 PG — LOW (ref 27–34)
MCHC RBC-ENTMCNC: 30.8 GM/DL — LOW (ref 32–36)
MCV RBC AUTO: 69.3 FL — LOW (ref 80–100)
MICROCYTES BLD QL: SLIGHT — SIGNIFICANT CHANGE UP
MONOCYTES # BLD AUTO: 0.19 K/UL — SIGNIFICANT CHANGE UP (ref 0–0.9)
MONOCYTES NFR BLD AUTO: 1.8 % — LOW (ref 2–14)
NEUTROPHILS # BLD AUTO: 9.01 K/UL — HIGH (ref 1.8–7.4)
NEUTROPHILS NFR BLD AUTO: 86.7 % — HIGH (ref 43–77)
OVALOCYTES BLD QL SMEAR: SLIGHT — SIGNIFICANT CHANGE UP
PLAT MORPH BLD: ABNORMAL
PLATELET # BLD AUTO: 204 K/UL — SIGNIFICANT CHANGE UP (ref 150–400)
POIKILOCYTOSIS BLD QL AUTO: SLIGHT — SIGNIFICANT CHANGE UP
RBC # BLD: 3.42 M/UL — LOW (ref 3.8–5.2)
RBC # FLD: 14.8 % — HIGH (ref 10.3–14.5)
RBC BLD AUTO: ABNORMAL
SCHISTOCYTES BLD QL AUTO: SLIGHT — SIGNIFICANT CHANGE UP
T PALLIDUM AB TITR SER: NEGATIVE — SIGNIFICANT CHANGE UP
WBC # BLD: 10.39 K/UL — SIGNIFICANT CHANGE UP (ref 3.8–10.5)
WBC # FLD AUTO: 10.39 K/UL — SIGNIFICANT CHANGE UP (ref 3.8–10.5)

## 2023-08-24 RX ORDER — ASCORBIC ACID 60 MG
500 TABLET,CHEWABLE ORAL THREE TIMES A DAY
Refills: 0 | Status: DISCONTINUED | OUTPATIENT
Start: 2023-08-24 | End: 2023-08-25

## 2023-08-24 RX ORDER — ACETAMINOPHEN 500 MG
3 TABLET ORAL
Qty: 0 | Refills: 0 | DISCHARGE
Start: 2023-08-24

## 2023-08-24 RX ORDER — IBUPROFEN 200 MG
1 TABLET ORAL
Qty: 0 | Refills: 0 | DISCHARGE
Start: 2023-08-24

## 2023-08-24 RX ORDER — IBUPROFEN 200 MG
600 TABLET ORAL EVERY 6 HOURS
Refills: 0 | Status: DISCONTINUED | OUTPATIENT
Start: 2023-08-24 | End: 2023-08-25

## 2023-08-24 RX ORDER — FERROUS SULFATE 325(65) MG
325 TABLET ORAL THREE TIMES A DAY
Refills: 0 | Status: DISCONTINUED | OUTPATIENT
Start: 2023-08-24 | End: 2023-08-25

## 2023-08-24 RX ORDER — SIMETHICONE 80 MG/1
1 TABLET, CHEWABLE ORAL
Qty: 0 | Refills: 0 | DISCHARGE
Start: 2023-08-24

## 2023-08-24 RX ADMIN — Medication 975 MILLIGRAM(S): at 09:48

## 2023-08-24 RX ADMIN — Medication 325 MILLIGRAM(S): at 17:23

## 2023-08-24 RX ADMIN — Medication 30 MILLIGRAM(S): at 00:39

## 2023-08-24 RX ADMIN — Medication 30 MILLIGRAM(S): at 12:28

## 2023-08-24 RX ADMIN — Medication 30 MILLIGRAM(S): at 18:23

## 2023-08-24 RX ADMIN — Medication 975 MILLIGRAM(S): at 20:02

## 2023-08-24 RX ADMIN — HEPARIN SODIUM 5000 UNIT(S): 5000 INJECTION INTRAVENOUS; SUBCUTANEOUS at 03:46

## 2023-08-24 RX ADMIN — Medication 975 MILLIGRAM(S): at 03:46

## 2023-08-24 RX ADMIN — Medication 975 MILLIGRAM(S): at 20:48

## 2023-08-24 RX ADMIN — Medication 500 MILLIGRAM(S): at 17:23

## 2023-08-24 RX ADMIN — Medication 30 MILLIGRAM(S): at 17:23

## 2023-08-24 RX ADMIN — Medication 100 MILLIGRAM(S): at 03:59

## 2023-08-24 RX ADMIN — HEPARIN SODIUM 5000 UNIT(S): 5000 INJECTION INTRAVENOUS; SUBCUTANEOUS at 17:23

## 2023-08-24 RX ADMIN — Medication 30 MILLIGRAM(S): at 00:09

## 2023-08-24 RX ADMIN — Medication 975 MILLIGRAM(S): at 08:48

## 2023-08-24 RX ADMIN — Medication 30 MILLIGRAM(S): at 11:28

## 2023-08-24 RX ADMIN — Medication 975 MILLIGRAM(S): at 04:16

## 2023-08-24 RX ADMIN — Medication 325 MILLIGRAM(S): at 22:14

## 2023-08-24 RX ADMIN — Medication 100 MILLIGRAM(S): at 20:01

## 2023-08-24 RX ADMIN — Medication 500 MILLIGRAM(S): at 22:13

## 2023-08-24 NOTE — DISCHARGE NOTE OB - CARE PROVIDER_API CALL
Kayla Lynne  Obstetrics and Gynecology  [address] [facility], [place] [address]  Phone: [phone]  Fax: [phone]  Follow Up Time:

## 2023-08-24 NOTE — DISCHARGE NOTE OB - MATERIALS PROVIDED
[facility] [place] Screening Program/[place]  Immunization Record/Bottle Feeding Log/Guide to Postpartum Care/[facility] Hearing Screen Program/Back To Sleep Handout/Shaken Baby Prevention Handout/Birth Certificate Instructions/Discharge Medication Information for Patients and Families Pocket Guide

## 2023-08-24 NOTE — PROGRESS NOTE ADULT - ATTENDING COMMENTS
I personally saw and evaluated patient and agree with above assessment and plan.  Pt is feeling well on POD 1 after repeat c/s  Her pain is well controlled and she remained afebrile.  Her incision is c/d/i  continue routine PO care  Discharge planning for POD 2     LAY Shepherd

## 2023-08-24 NOTE — DISCHARGE NOTE OB - HOSPITAL COURSE
Pt underwent a C/S without any complications. Her postpartum course was uneventful. She met all her milestones in regards to her vitals, postpartum labs, diet, ambulation, pain management. Follow up discussed. Pt was discharged home on POD#

## 2023-08-24 NOTE — DISCHARGE NOTE OB - MEDICATION SUMMARY - MEDICATIONS TO TAKE
I will START or STAY ON the medications listed below when I get home from the hospital:    ibuprofen 600 mg oral tablet  -- 1 tab(s) by mouth every 6 hours  -- Indication: For  for pain and fever    acetaminophen 325 mg oral tablet  -- 3 tab(s) by mouth every 8 hours  -- Indication: For  for pain and fever    simethicone 80 mg oral tablet, chewable  -- 1 tab(s) by mouth every 4 hours As needed Gas  -- Indication: For  for gas pain   I will START or STAY ON the medications listed below when I get home from the hospital:    ibuprofen 600 mg oral tablet  -- 1 tab(s) by mouth every 6 hours  -- Indication: For  for pain and fever    ibuprofen 600 mg oral tablet  -- 1 tab(s) by mouth every 6 hours  -- Indication: For for pain and fever    acetaminophen 325 mg oral tablet  -- 3 tab(s) by mouth every 8 hours  -- Indication: For  for pain and fever    oxyCODONE 5 mg oral tablet  -- 1 tab(s) by mouth 4 times a day as needed for  moderate pain MDD: 4  -- Indication: For for break through pain    simethicone 80 mg oral tablet, chewable  -- 1 tab(s) by mouth every 4 hours As needed Gas  -- Indication: For  for gas pain

## 2023-08-24 NOTE — DISCHARGE NOTE OB - PATIENT PORTAL LINK FT
You can access the FollowMyHealth Patient Portal offered by [facility] by registering at the following website: [email or web address]. By joining [employer]’s FollowMyHealth portal, you will also be able to view your health information using other applications (apps) compatible with our system.

## 2023-08-24 NOTE — PROGRESS NOTE ADULT - PROBLEM SELECTOR PROBLEM 1
INTERVENTIONS (from last visit with updates):  1.   Injections:    · Date 6/12/2019, Bilateral L4-L5, L5-S1 lumbar medial branch block     2.   Physical Therapy: Currently in a structured PT program at home       3.   Surgeries (spine, joint, related to pain):   · None related to pain     4.   Procedures:  · None       If on contract (update):  · Urine tox screen: None  · Prescription Drug Monitoring Program verified this visit.  · Opioid contract:  signed on 11/15/2022      delivery delivered

## 2023-08-25 VITALS
SYSTOLIC BLOOD PRESSURE: 101 MMHG | TEMPERATURE: 98 F | OXYGEN SATURATION: 98 % | DIASTOLIC BLOOD PRESSURE: 67 MMHG | HEART RATE: 69 BPM | RESPIRATION RATE: 18 BRPM

## 2023-08-25 RX ORDER — IBUPROFEN 200 MG
1 TABLET ORAL
Qty: 0 | Refills: 0 | DISCHARGE
Start: 2023-08-25

## 2023-08-25 RX ORDER — OXYCODONE HYDROCHLORIDE 5 MG/1
1 TABLET ORAL
Qty: 8 | Refills: 0
Start: 2023-08-25 | End: 2023-08-26

## 2023-08-25 RX ADMIN — Medication 600 MILLIGRAM(S): at 00:27

## 2023-08-25 RX ADMIN — Medication 325 MILLIGRAM(S): at 05:52

## 2023-08-25 RX ADMIN — Medication 600 MILLIGRAM(S): at 05:51

## 2023-08-25 RX ADMIN — Medication 100 MILLIGRAM(S): at 05:53

## 2023-08-25 RX ADMIN — Medication 600 MILLIGRAM(S): at 12:36

## 2023-08-25 RX ADMIN — HEPARIN SODIUM 5000 UNIT(S): 5000 INJECTION INTRAVENOUS; SUBCUTANEOUS at 05:52

## 2023-08-25 RX ADMIN — Medication 975 MILLIGRAM(S): at 03:15

## 2023-08-25 RX ADMIN — Medication 975 MILLIGRAM(S): at 09:18

## 2023-08-25 RX ADMIN — Medication 600 MILLIGRAM(S): at 11:45

## 2023-08-25 RX ADMIN — Medication 975 MILLIGRAM(S): at 02:45

## 2023-08-25 RX ADMIN — Medication 600 MILLIGRAM(S): at 06:30

## 2023-08-25 RX ADMIN — Medication 975 MILLIGRAM(S): at 09:44

## 2023-08-25 RX ADMIN — Medication 500 MILLIGRAM(S): at 05:52

## 2023-08-25 RX ADMIN — Medication 600 MILLIGRAM(S): at 01:00

## 2023-08-25 NOTE — PROGRESS NOTE ADULT - PROBLEM SELECTOR PLAN 1
Increase OOB  pain protocol  DVT ppx  Regular diet  Continue routine post op care and pain protocol
Increase OOB  DVT ppx  Dressing removed  Due to void  Regular diet  AM CBC-as stated above  Continue routine post op care and pain protocol
Albendazole Counseling:  I discussed with the patient the risks of albendazole including but not limited to cytopenia, kidney damage, nausea/vomiting and severe allergy.  The patient understands that this medication is being used in an off-label manner.

## 2023-08-25 NOTE — PROGRESS NOTE ADULT - SUBJECTIVE AND OBJECTIVE BOX
Day 1 of Anesthesia Pain Management Service    SUBJECTIVE:  Pain Scale Score:          [X] Refer to charted pain scores    THERAPY:    s/p neuraxial PF morphine    MEDICATIONS  (STANDING):  acetaminophen     Tablet .. 975 milliGRAM(s) Oral <User Schedule>  ascorbic acid 500 milliGRAM(s) Oral three times a day  diphtheria/tetanus/pertussis (acellular) Vaccine (Adacel) 0.5 milliLiter(s) IntraMuscular once  ferrous    sulfate 325 milliGRAM(s) Oral three times a day  heparin   Injectable 5000 Unit(s) SubCutaneous every 12 hours  ibuprofen  Tablet. 600 milliGRAM(s) Oral every 6 hours  ketorolac   Injectable 30 milliGRAM(s) IV Push every 6 hours  lactated ringers. 1000 milliLiter(s) (125 mL/Hr) IV Continuous <Continuous>  lactated ringers. 1000 milliLiter(s) (125 mL/Hr) IV Continuous <Continuous>  morphine PF Spinal 0.1 milliGRAM(s) IntraThecal. once  oxytocin Infusion 333.333 milliUNIT(s)/Min (1000 mL/Hr) IV Continuous <Continuous>  oxytocin Infusion 333.333 milliUNIT(s)/Min (1000 mL/Hr) IV Continuous <Continuous>    MEDICATIONS  (PRN):  butorphanol Injectable 0.125 milliGRAM(s) IV Push every 6 hours PRN Pruritus  dexAMETHasone  Injectable 4 milliGRAM(s) IV Push every 6 hours PRN Nausea  diphenhydrAMINE 25 milliGRAM(s) Oral every 6 hours PRN Pruritus  diphenhydrAMINE Injectable 25 milliGRAM(s) IV Push every 4 hours PRN Pruritus  guaiFENesin Oral Liquid (Sugar-Free) 100 milliGRAM(s) Oral every 6 hours PRN Cough  lanolin Ointment 1 Application(s) Topical every 6 hours PRN Sore Nipples  magnesium hydroxide Suspension 30 milliLiter(s) Oral two times a day PRN Constipation  nalbuphine Injectable 2.5 milliGRAM(s) IV Push every 6 hours PRN Pruritus  naloxone Injectable 0.1 milliGRAM(s) IV Push every 3 minutes PRN For ANY of the following changes in patient status:  A. Breaths Per Minute LESS THAN 10, B. Oxygen saturation LESS THAN 90%, C. Sedation score of 6 for Stop After: 4 Times  ondansetron Injectable 4 milliGRAM(s) IV Push every 6 hours PRN Nausea  oxyCODONE    IR 5 milliGRAM(s) Oral every 3 hours PRN Mild Pain (1 - 3)  oxyCODONE    IR 5 milliGRAM(s) Oral every 3 hours PRN Moderate to Severe Pain (4-10)  oxyCODONE    IR 10 milliGRAM(s) Oral every 3 hours PRN Moderate Pain (4 - 6)  oxyCODONE    IR 5 milliGRAM(s) Oral once PRN Moderate to Severe Pain (4-10)  simethicone 80 milliGRAM(s) Chew every 4 hours PRN Gas      OBJECTIVE:    Sedation:        	[X] Alert	[ ] Drowsy	[ ] Arousable      [ ] Asleep       [ ] Unresponsive    Side Effects:	[X] None	[ ] Nausea	[ ] Vomiting         [ ] Pruritus  		[ ] Weakness            [ ] Numbness	          [ ] Other:    Vital Signs Last 24 Hrs  T(C): 36.9 (24 Aug 2023 06:18), Max: 36.9 (23 Aug 2023 10:54)  T(F): 98.5 (24 Aug 2023 06:18), Max: 98.5 (24 Aug 2023 00:57)  HR: 70 (24 Aug 2023 06:18) (48 - 77)  BP: 121/82 (24 Aug 2023 06:18) (108/59 - 141/66)  BP(mean): 85 (23 Aug 2023 21:05) (80 - 85)  RR: 18 (24 Aug 2023 06:18) (16 - 21)  SpO2: 97% (24 Aug 2023 06:18) (90% - 100%)    Parameters below as of 24 Aug 2023 06:18  Patient On (Oxygen Delivery Method): room air        ASSESSMENT/ PLAN  [X] Patient transitioned to prn analgesics  [X] Pain management per primary service, pain service to sign off   [X]Documentation and Verification of current medications
Postpartum Note-  Section POD#1    Allergies: No Known Allergies    Blood Type: B positive  Rubella: Immune  RPR: Negative       S: Patient is a 37y  POD#1 s/p rLTCS.    Patient w/o complaints, pain is controlled.  Pt is OOB, tolerating PO, passing flatus. Lochia WNL.     O:  Vital Signs Last 24 Hrs  T(C): 36.9 (24 Aug 2023 06:18), Max: 36.9 (23 Aug 2023 10:54)  T(F): 98.5 (24 Aug 2023 06:18), Max: 98.5 (24 Aug 2023 00:57)  HR: 70 (24 Aug 2023 06:18) (48 - 77)  BP: 121/82 (24 Aug 2023 06:18) (108/59 - 141/66)  BP(mean): 85 (23 Aug 2023 21:05) (80 - 85)  RR: 18 (24 Aug 2023 06:18) (16 - 21)  SpO2: 97% (24 Aug 2023 06:18) (90% - 100%)    Parameters below as of 24 Aug 2023 06:18  Patient On (Oxygen Delivery Method): room air      I&O's Summary    23 Aug 2023 07:01  -  24 Aug 2023 07:00  --------------------------------------------------------  IN: 3000 mL / OUT: 1256 mL / NET: 1744 mL      Gen: NAD  Abdomen: Soft, nontender, non-distended, fundus firm.  Incision: Clean, dry, and intact. Negative erythema/edema/ecchymosis. Sub Q  Lochia WNL  Ext: PAS in place. Negative Homans B/L    LABS:                          7.3    10.39 )-----------( 204      ( 24 Aug 2023 06:24 )             23.7       A/P:  37y POD#1 s/p rLTCS, doing well.     PMHx: Denies  Current Issues: Anemia due to acute blood loss-pt asx, vitals stable, will start Iron/Vitamin C and continue to monitor     Increase OOB  DVT ppx  Dressing removed  Due to void  Regular diet  AM CBC-as stated above  Continue routine post op care and pain protocol    Viola Pleitez PA-C        
Postpartum Note-  Section POD#2    Allergies: No Known Allergies    Blood Type: B positive  Rubella: Immune  RPR: Negative       S:   Patient w/o complaints, pain is controlled.  Pt is OOB, tolerating PO, passing flatus. Lochia WNL.     O:  Vital Signs Last 24 Hrs  T(C): 36.9 (25 Aug 2023 06:09), Max: 36.9 (25 Aug 2023 00:16)  T(F): 98.5 (25 Aug 2023 06:09), Max: 98.5 (25 Aug 2023 06:09)  HR: 69 (25 Aug 2023 06:09) (66 - 87)  BP: 101/67 (25 Aug 2023 06:09) (101/67 - 118/79)  BP(mean): --  RR: 18 (25 Aug 2023 06:09) (17 - 18)  SpO2: 98% (25 Aug 2023 06:09) (98% - 100%)    Parameters below as of 25 Aug 2023 06:09  Patient On (Oxygen Delivery Method): room air      Gen: NAD  Abdomen: Soft, nontender, mildly distended-tympanic, fundus firm.  Incision: Clean, dry, and intact. Negative erythema/edema/ecchymosis. Sub Q  Lochia WNL  Ext: Soft/NT      LABS:                          7.3    10.39 )-----------( 204      ( 24 Aug 2023 06:24 )             23.7

## 2023-08-25 NOTE — PROGRESS NOTE ADULT - PROBLEM SELECTOR PLAN 2
pt asx, vitals stable, will start Iron/Vitamin C and continue to monitor
pt asx, vitals stable, will start Iron/Vitamin C and continue to monitor

## 2023-08-25 NOTE — PROGRESS NOTE ADULT - ASSESSMENT
37y  POD#2 s/p rLTCS, doing well.       PMHx: Denies  Current Issues: Anemia due to acute blood loss-pt asx, vitals stable, will start Iron/Vitamin C and continue to monitor   
37y POD#1 s/p rLTCS, doing well.

## 2023-08-25 NOTE — PROGRESS NOTE ADULT - NS ATTEND AMEND GEN_ALL_CORE FT
Patient was examined at bedside  She is feeling well, incisional pain when she coughs. She had URI prior to admisstion   abd soft  Inc clean, dry and intact  ext nl, mild edema b/l    Patient for discharge tonight  She would like to go home before sunset due to being observant of maris

## 2023-10-08 PROCEDURE — 86769 SARS-COV-2 COVID-19 ANTIBODY: CPT

## 2023-10-08 PROCEDURE — 86900 BLOOD TYPING SEROLOGIC ABO: CPT

## 2023-10-08 PROCEDURE — 59050 FETAL MONITOR W/REPORT: CPT

## 2023-10-08 PROCEDURE — 36415 COLL VENOUS BLD VENIPUNCTURE: CPT

## 2023-10-08 PROCEDURE — 85027 COMPLETE CBC AUTOMATED: CPT

## 2023-10-08 PROCEDURE — 86780 TREPONEMA PALLIDUM: CPT

## 2023-10-08 PROCEDURE — 86850 RBC ANTIBODY SCREEN: CPT

## 2023-10-08 PROCEDURE — 85025 COMPLETE CBC W/AUTO DIFF WBC: CPT

## 2023-10-08 PROCEDURE — 59025 FETAL NON-STRESS TEST: CPT

## 2023-10-08 PROCEDURE — 86901 BLOOD TYPING SEROLOGIC RH(D): CPT

## 2023-11-21 NOTE — OB RN PATIENT PROFILE - FUNCTIONAL ASSESSMENT - BASIC MOBILITY 6.
Modified Advancement Flap Text: The defect edges were debeveled with a #15 scalpel blade. Given the location of the defect, shape of the defect and the proximity to free margins a modified advancement flap was deemed most appropriate. Using a sterile surgical marker, an appropriate advancement flap was drawn incorporating the defect and placing the expected incisions within the relaxed skin tension lines where possible. The area thus outlined was incised deep to adipose tissue with a #15 scalpel blade. The skin margins were undermined to an appropriate distance in all directions utilizing iris scissors. Following this, the designed flap was advanced and carried over into the primary defect and sutured into place. 4 = No assist / stand by assistance

## 2025-01-24 NOTE — DISCHARGE NOTE OB - FALL HARM RISK - PT AGE POPULATION HIDDEN
Medication: Leo passed protocol.   Last office visit date: 11/06/2024  Next appointment scheduled?: Yes, 11/06/2025  Number of refills given: 2    Adult

## 2025-04-07 NOTE — OB RN INTRAOPERATIVE NOTE - NS_CULTURES_OBGYN_ALL_OB
Refill must be reviewed and completed by the office or provider. The refill is unable to be approved or denied by the medication management team.    Patient request different pharmacy-        No

## 2025-07-11 ENCOUNTER — NON-APPOINTMENT (OUTPATIENT)
Age: 40
End: 2025-07-11